# Patient Record
Sex: MALE | Race: WHITE | Employment: FULL TIME | URBAN - METROPOLITAN AREA
[De-identification: names, ages, dates, MRNs, and addresses within clinical notes are randomized per-mention and may not be internally consistent; named-entity substitution may affect disease eponyms.]

---

## 2018-10-23 ENCOUNTER — HOSPITAL ENCOUNTER (EMERGENCY)
Age: 43
Discharge: HOME | End: 2018-10-23
Attending: EMERGENCY MEDICINE
Payer: COMMERCIAL

## 2018-10-23 ENCOUNTER — APPOINTMENT (OUTPATIENT)
Dept: CT IMAGING | Age: 43
End: 2018-10-23
Payer: COMMERCIAL

## 2018-10-23 VITALS
TEMPERATURE: 98.9 F | RESPIRATION RATE: 16 BRPM | HEART RATE: 88 BPM | BODY MASS INDEX: 29.82 KG/M2 | SYSTOLIC BLOOD PRESSURE: 201 MMHG | HEIGHT: 67 IN | OXYGEN SATURATION: 94 % | WEIGHT: 190 LBS | DIASTOLIC BLOOD PRESSURE: 135 MMHG

## 2018-10-23 DIAGNOSIS — K85.20 ALCOHOL-INDUCED ACUTE PANCREATITIS, UNSPECIFIED COMPLICATION STATUS: Primary | ICD-10-CM

## 2018-10-23 LAB
ALBUMIN SERPL-MCNC: 4.3 G/DL (ref 3.5–5.2)
ALP BLD-CCNC: 114 U/L (ref 40–129)
ALT SERPL-CCNC: 45 U/L (ref 0–40)
ANION GAP SERPL CALCULATED.3IONS-SCNC: 15 MMOL/L (ref 7–16)
AST SERPL-CCNC: 57 U/L (ref 0–39)
BASOPHILS ABSOLUTE: 0.05 E9/L (ref 0–0.2)
BASOPHILS RELATIVE PERCENT: 0.4 % (ref 0–2)
BILIRUB SERPL-MCNC: 1.6 MG/DL (ref 0–1.2)
BUN BLDV-MCNC: 5 MG/DL (ref 6–20)
CALCIUM SERPL-MCNC: 9.4 MG/DL (ref 8.6–10.2)
CHLORIDE BLD-SCNC: 96 MMOL/L (ref 98–107)
CO2: 26 MMOL/L (ref 22–29)
CREAT SERPL-MCNC: 0.7 MG/DL (ref 0.7–1.2)
EOSINOPHILS ABSOLUTE: 0.09 E9/L (ref 0.05–0.5)
EOSINOPHILS RELATIVE PERCENT: 0.6 % (ref 0–6)
GFR AFRICAN AMERICAN: >60
GFR NON-AFRICAN AMERICAN: >60 ML/MIN/1.73
GLUCOSE BLD-MCNC: 108 MG/DL (ref 74–109)
HCT VFR BLD CALC: 52 % (ref 37–54)
HEMOGLOBIN: 18.7 G/DL (ref 12.5–16.5)
IMMATURE GRANULOCYTES #: 0.08 E9/L
IMMATURE GRANULOCYTES %: 0.6 % (ref 0–5)
LACTIC ACID: 1.2 MMOL/L (ref 0.5–2.2)
LIPASE: 1070 U/L (ref 13–60)
LYMPHOCYTES ABSOLUTE: 0.99 E9/L (ref 1.5–4)
LYMPHOCYTES RELATIVE PERCENT: 7.1 % (ref 20–42)
MCH RBC QN AUTO: 35.8 PG (ref 26–35)
MCHC RBC AUTO-ENTMCNC: 36 % (ref 32–34.5)
MCV RBC AUTO: 99.4 FL (ref 80–99.9)
MONOCYTES ABSOLUTE: 1.46 E9/L (ref 0.1–0.95)
MONOCYTES RELATIVE PERCENT: 10.5 % (ref 2–12)
NEUTROPHILS ABSOLUTE: 11.19 E9/L (ref 1.8–7.3)
NEUTROPHILS RELATIVE PERCENT: 80.8 % (ref 43–80)
PDW BLD-RTO: 12.3 FL (ref 11.5–15)
PLATELET # BLD: 229 E9/L (ref 130–450)
PMV BLD AUTO: 9.1 FL (ref 7–12)
POTASSIUM SERPL-SCNC: 4 MMOL/L (ref 3.5–5)
RBC # BLD: 5.23 E12/L (ref 3.8–5.8)
SODIUM BLD-SCNC: 137 MMOL/L (ref 132–146)
TOTAL PROTEIN: 8.2 G/DL (ref 6.4–8.3)
WBC # BLD: 13.9 E9/L (ref 4.5–11.5)

## 2018-10-23 PROCEDURE — 6360000002 HC RX W HCPCS: Performed by: EMERGENCY MEDICINE

## 2018-10-23 PROCEDURE — 36415 COLL VENOUS BLD VENIPUNCTURE: CPT

## 2018-10-23 PROCEDURE — 6360000004 HC RX CONTRAST MEDICATION: Performed by: RADIOLOGY

## 2018-10-23 PROCEDURE — 85025 COMPLETE CBC W/AUTO DIFF WBC: CPT

## 2018-10-23 PROCEDURE — 99284 EMERGENCY DEPT VISIT MOD MDM: CPT

## 2018-10-23 PROCEDURE — 80053 COMPREHEN METABOLIC PANEL: CPT

## 2018-10-23 PROCEDURE — 96376 TX/PRO/DX INJ SAME DRUG ADON: CPT

## 2018-10-23 PROCEDURE — 2580000003 HC RX 258: Performed by: EMERGENCY MEDICINE

## 2018-10-23 PROCEDURE — 83690 ASSAY OF LIPASE: CPT

## 2018-10-23 PROCEDURE — 74177 CT ABD & PELVIS W/CONTRAST: CPT

## 2018-10-23 PROCEDURE — 96374 THER/PROPH/DIAG INJ IV PUSH: CPT

## 2018-10-23 PROCEDURE — 96375 TX/PRO/DX INJ NEW DRUG ADDON: CPT

## 2018-10-23 PROCEDURE — 83605 ASSAY OF LACTIC ACID: CPT

## 2018-10-23 RX ORDER — MORPHINE SULFATE 4 MG/ML
4 INJECTION, SOLUTION INTRAMUSCULAR; INTRAVENOUS ONCE
Status: COMPLETED | OUTPATIENT
Start: 2018-10-23 | End: 2018-10-23

## 2018-10-23 RX ORDER — 0.9 % SODIUM CHLORIDE 0.9 %
1000 INTRAVENOUS SOLUTION INTRAVENOUS ONCE
Status: COMPLETED | OUTPATIENT
Start: 2018-10-23 | End: 2018-10-23

## 2018-10-23 RX ORDER — ONDANSETRON 2 MG/ML
4 INJECTION INTRAMUSCULAR; INTRAVENOUS ONCE
Status: COMPLETED | OUTPATIENT
Start: 2018-10-23 | End: 2018-10-23

## 2018-10-23 RX ORDER — HYDROCODONE BITARTRATE AND ACETAMINOPHEN 5; 325 MG/1; MG/1
1 TABLET ORAL EVERY 6 HOURS PRN
Qty: 8 TABLET | Refills: 0 | Status: SHIPPED | OUTPATIENT
Start: 2018-10-23 | End: 2018-10-25

## 2018-10-23 RX ADMIN — ONDANSETRON 4 MG: 2 INJECTION INTRAMUSCULAR; INTRAVENOUS at 10:05

## 2018-10-23 RX ADMIN — MORPHINE SULFATE 4 MG: 4 INJECTION INTRAVENOUS at 10:50

## 2018-10-23 RX ADMIN — MORPHINE SULFATE 4 MG: 4 INJECTION INTRAVENOUS at 10:05

## 2018-10-23 RX ADMIN — SODIUM CHLORIDE 1000 ML: 9 INJECTION, SOLUTION INTRAVENOUS at 10:05

## 2018-10-23 RX ADMIN — IOPAMIDOL 80 ML: 755 INJECTION, SOLUTION INTRAVENOUS at 11:21

## 2018-10-23 ASSESSMENT — PAIN DESCRIPTION - LOCATION
LOCATION: ABDOMEN
LOCATION: ABDOMEN;BACK
LOCATION: ABDOMEN
LOCATION: ABDOMEN

## 2018-10-23 ASSESSMENT — PAIN SCALES - GENERAL
PAINLEVEL_OUTOF10: 7
PAINLEVEL_OUTOF10: 7
PAINLEVEL_OUTOF10: 8
PAINLEVEL_OUTOF10: 9
PAINLEVEL_OUTOF10: 8

## 2018-10-23 ASSESSMENT — PAIN DESCRIPTION - DESCRIPTORS: DESCRIPTORS: THROBBING

## 2018-10-23 ASSESSMENT — ENCOUNTER SYMPTOMS
CONSTIPATION: 0
SINUS PAIN: 0
RHINORRHEA: 0
NAUSEA: 1
BACK PAIN: 0
SORE THROAT: 0
WHEEZING: 0
COUGH: 0
VOMITING: 1
SINUS PRESSURE: 0
SHORTNESS OF BREATH: 0
DIARRHEA: 0
PHOTOPHOBIA: 0
ABDOMINAL PAIN: 1

## 2018-10-23 ASSESSMENT — PAIN DESCRIPTION - ORIENTATION
ORIENTATION: RIGHT;LEFT;MID;UPPER
ORIENTATION: RIGHT;LEFT;MID;UPPER
ORIENTATION: RIGHT;LEFT;UPPER;MID
ORIENTATION: MID

## 2018-10-23 ASSESSMENT — PAIN DESCRIPTION - PAIN TYPE
TYPE: ACUTE PAIN

## 2018-10-23 ASSESSMENT — PAIN DESCRIPTION - FREQUENCY
FREQUENCY: CONTINUOUS

## 2018-10-23 NOTE — ED PROVIDER NOTES
(1.702 m)   Wt 190 lb (86.2 kg)   SpO2 94%   BMI 29.76 kg/m²   Oxygen Saturation Interpretation: Normal      ------------------------------------------ PROGRESS NOTES ------------------------------------------  Time: 11:41. Discussed results of the laboratory work and CT with the patient. I did advise the patient to be admitted for acute pancreatitis secondary to alcohol use. The patient states that he will think about it and will give me his decision and a little bit. Time: 12:19. The patient states that he is still unsure of his decision at this time. He will contact his boss. He is to see if he gets some time off. Time: 12:31. The patient states he does not want to be admitted this time. He does understand the risks associated with this. He wants to try doing treatment at home. Patient does understand that I strongly encouraged a clear liquid diet for the next 24 hours and then slowly advance his diet. Patient will be given Norco for his pain, only as needed. Patient will return if he has worsening symptoms. He has no further questions at this time. I have spoken with the patient and discussed todays results, in addition to providing specific details for the plan of care and counseling regarding the diagnosis and prognosis. Their questions are answered at this time and they are agreeable with the plan. I discussed at length with them reasons for immediate return here for re evaluation. They will followup with primary care by calling their office tomorrow. --------------------------------- ADDITIONAL PROVIDER NOTES ---------------------------------  At this time the patient is without objective evidence of an acute process requiring hospitalization or inpatient management. They have remained hemodynamically stable throughout their entire ED visit and are stable for discharge with outpatient follow-up.      The plan has been discussed in detail and they are aware of the specific conditions for

## 2020-06-08 ENCOUNTER — APPOINTMENT (OUTPATIENT)
Dept: GENERAL RADIOLOGY | Age: 45
End: 2020-06-08
Payer: COMMERCIAL

## 2020-06-08 ENCOUNTER — APPOINTMENT (OUTPATIENT)
Dept: CT IMAGING | Age: 45
End: 2020-06-08
Payer: COMMERCIAL

## 2020-06-08 ENCOUNTER — HOSPITAL ENCOUNTER (EMERGENCY)
Age: 45
Discharge: HOME | End: 2020-06-08
Attending: EMERGENCY MEDICINE
Payer: COMMERCIAL

## 2020-06-08 VITALS
SYSTOLIC BLOOD PRESSURE: 164 MMHG | BODY MASS INDEX: 32.18 KG/M2 | WEIGHT: 205 LBS | RESPIRATION RATE: 13 BRPM | OXYGEN SATURATION: 96 % | TEMPERATURE: 98.8 F | HEIGHT: 67 IN | DIASTOLIC BLOOD PRESSURE: 106 MMHG | HEART RATE: 87 BPM

## 2020-06-08 LAB
ALBUMIN SERPL-MCNC: 4.9 G/DL (ref 3.5–5.2)
ALP BLD-CCNC: 108 U/L (ref 40–129)
ALT SERPL-CCNC: 46 U/L (ref 0–40)
ANION GAP SERPL CALCULATED.3IONS-SCNC: 17 MMOL/L (ref 7–16)
ANISOCYTOSIS: ABNORMAL
AST SERPL-CCNC: 55 U/L (ref 0–39)
BASOPHILS ABSOLUTE: 0 E9/L (ref 0–0.2)
BASOPHILS RELATIVE PERCENT: 0.2 % (ref 0–2)
BILIRUB SERPL-MCNC: 1.6 MG/DL (ref 0–1.2)
BUN BLDV-MCNC: 12 MG/DL (ref 6–20)
CALCIUM SERPL-MCNC: 9.8 MG/DL (ref 8.6–10.2)
CHLORIDE BLD-SCNC: 94 MMOL/L (ref 98–107)
CO2: 24 MMOL/L (ref 22–29)
CREAT SERPL-MCNC: 0.8 MG/DL (ref 0.7–1.2)
EOSINOPHILS ABSOLUTE: 0 E9/L (ref 0.05–0.5)
EOSINOPHILS RELATIVE PERCENT: 0.7 % (ref 0–6)
GFR AFRICAN AMERICAN: >60
GFR NON-AFRICAN AMERICAN: >60 ML/MIN/1.73
GLUCOSE BLD-MCNC: 152 MG/DL (ref 74–99)
HCT VFR BLD CALC: 52.8 % (ref 37–54)
HEMOGLOBIN: 18.7 G/DL (ref 12.5–16.5)
LIPASE: 620 U/L (ref 13–60)
LYMPHOCYTES ABSOLUTE: 1.57 E9/L (ref 1.5–4)
LYMPHOCYTES RELATIVE PERCENT: 11.4 % (ref 20–42)
MCH RBC QN AUTO: 38.5 PG (ref 26–35)
MCHC RBC AUTO-ENTMCNC: 35.4 % (ref 32–34.5)
MCV RBC AUTO: 108.6 FL (ref 80–99.9)
MONOCYTES ABSOLUTE: 0.57 E9/L (ref 0.1–0.95)
MONOCYTES RELATIVE PERCENT: 4.4 % (ref 2–12)
NEUTROPHILS ABSOLUTE: 12.01 E9/L (ref 1.8–7.3)
NEUTROPHILS RELATIVE PERCENT: 84.2 % (ref 43–80)
PDW BLD-RTO: 14.3 FL (ref 11.5–15)
PLATELET # BLD: 236 E9/L (ref 130–450)
PMV BLD AUTO: 8.9 FL (ref 7–12)
POLYCHROMASIA: ABNORMAL
POTASSIUM REFLEX MAGNESIUM: 3.9 MMOL/L (ref 3.5–5)
RBC # BLD: 4.86 E12/L (ref 3.8–5.8)
SODIUM BLD-SCNC: 135 MMOL/L (ref 132–146)
TOTAL PROTEIN: 9.5 G/DL (ref 6.4–8.3)
TROPONIN: <0.01 NG/ML (ref 0–0.03)
WBC # BLD: 14.3 E9/L (ref 4.5–11.5)

## 2020-06-08 PROCEDURE — 85025 COMPLETE CBC W/AUTO DIFF WBC: CPT

## 2020-06-08 PROCEDURE — 96374 THER/PROPH/DIAG INJ IV PUSH: CPT

## 2020-06-08 PROCEDURE — 6360000002 HC RX W HCPCS: Performed by: EMERGENCY MEDICINE

## 2020-06-08 PROCEDURE — 80053 COMPREHEN METABOLIC PANEL: CPT

## 2020-06-08 PROCEDURE — 36415 COLL VENOUS BLD VENIPUNCTURE: CPT

## 2020-06-08 PROCEDURE — 96375 TX/PRO/DX INJ NEW DRUG ADDON: CPT

## 2020-06-08 PROCEDURE — 2580000003 HC RX 258: Performed by: EMERGENCY MEDICINE

## 2020-06-08 PROCEDURE — 74177 CT ABD & PELVIS W/CONTRAST: CPT

## 2020-06-08 PROCEDURE — 6360000004 HC RX CONTRAST MEDICATION: Performed by: RADIOLOGY

## 2020-06-08 PROCEDURE — 83690 ASSAY OF LIPASE: CPT

## 2020-06-08 PROCEDURE — 84484 ASSAY OF TROPONIN QUANT: CPT

## 2020-06-08 PROCEDURE — 99285 EMERGENCY DEPT VISIT HI MDM: CPT

## 2020-06-08 PROCEDURE — 71045 X-RAY EXAM CHEST 1 VIEW: CPT

## 2020-06-08 RX ORDER — KETOROLAC TROMETHAMINE 15 MG/ML
15 INJECTION, SOLUTION INTRAMUSCULAR; INTRAVENOUS ONCE
Status: COMPLETED | OUTPATIENT
Start: 2020-06-08 | End: 2020-06-08

## 2020-06-08 RX ORDER — FENTANYL CITRATE 50 UG/ML
50 INJECTION, SOLUTION INTRAMUSCULAR; INTRAVENOUS ONCE
Status: COMPLETED | OUTPATIENT
Start: 2020-06-08 | End: 2020-06-08

## 2020-06-08 RX ORDER — AMLODIPINE BESYLATE 5 MG/1
5 TABLET ORAL DAILY
COMMUNITY

## 2020-06-08 RX ORDER — ONDANSETRON 4 MG/1
4 TABLET, ORALLY DISINTEGRATING ORAL EVERY 6 HOURS PRN
Qty: 20 TABLET | Refills: 0 | Status: SHIPPED | OUTPATIENT
Start: 2020-06-08 | End: 2022-02-10 | Stop reason: ALTCHOICE

## 2020-06-08 RX ORDER — HYDROCODONE BITARTRATE AND ACETAMINOPHEN 5; 325 MG/1; MG/1
1 TABLET ORAL EVERY 6 HOURS PRN
Qty: 12 TABLET | Refills: 0 | Status: SHIPPED | OUTPATIENT
Start: 2020-06-08 | End: 2020-06-11

## 2020-06-08 RX ORDER — ONDANSETRON 2 MG/ML
4 INJECTION INTRAMUSCULAR; INTRAVENOUS ONCE
Status: COMPLETED | OUTPATIENT
Start: 2020-06-08 | End: 2020-06-08

## 2020-06-08 RX ORDER — LISINOPRIL 20 MG/1
20 TABLET ORAL DAILY
COMMUNITY
End: 2022-02-10 | Stop reason: ALTCHOICE

## 2020-06-08 RX ORDER — 0.9 % SODIUM CHLORIDE 0.9 %
1000 INTRAVENOUS SOLUTION INTRAVENOUS ONCE
Status: COMPLETED | OUTPATIENT
Start: 2020-06-08 | End: 2020-06-08

## 2020-06-08 RX ADMIN — FENTANYL CITRATE 50 MCG: 50 INJECTION, SOLUTION INTRAMUSCULAR; INTRAVENOUS at 06:17

## 2020-06-08 RX ADMIN — SODIUM CHLORIDE 1000 ML: 9 INJECTION, SOLUTION INTRAVENOUS at 04:59

## 2020-06-08 RX ADMIN — ONDANSETRON 4 MG: 2 INJECTION INTRAMUSCULAR; INTRAVENOUS at 05:00

## 2020-06-08 RX ADMIN — KETOROLAC TROMETHAMINE 15 MG: 15 INJECTION, SOLUTION INTRAMUSCULAR; INTRAVENOUS at 04:59

## 2020-06-08 RX ADMIN — IOPAMIDOL 80 ML: 755 INJECTION, SOLUTION INTRAVENOUS at 05:59

## 2020-06-08 ASSESSMENT — PAIN DESCRIPTION - FREQUENCY
FREQUENCY: CONTINUOUS
FREQUENCY: CONTINUOUS

## 2020-06-08 ASSESSMENT — PAIN SCALES - GENERAL
PAINLEVEL_OUTOF10: 9

## 2020-06-08 ASSESSMENT — PAIN DESCRIPTION - ONSET
ONSET: GRADUAL
ONSET: ON-GOING

## 2020-06-08 ASSESSMENT — PAIN DESCRIPTION - DESCRIPTORS
DESCRIPTORS: THROBBING
DESCRIPTORS: THROBBING

## 2020-06-08 ASSESSMENT — PAIN - FUNCTIONAL ASSESSMENT
PAIN_FUNCTIONAL_ASSESSMENT: PREVENTS OR INTERFERES WITH ALL ACTIVE AND SOME PASSIVE ACTIVITIES
PAIN_FUNCTIONAL_ASSESSMENT: PREVENTS OR INTERFERES WITH ALL ACTIVE AND SOME PASSIVE ACTIVITIES

## 2020-06-08 ASSESSMENT — PAIN DESCRIPTION - LOCATION
LOCATION: ABDOMEN;CHEST
LOCATION: ABDOMEN;CHEST

## 2020-06-08 ASSESSMENT — PAIN DESCRIPTION - PAIN TYPE
TYPE: ACUTE PAIN
TYPE: ACUTE PAIN

## 2020-06-08 ASSESSMENT — PAIN DESCRIPTION - PROGRESSION
CLINICAL_PROGRESSION: NOT CHANGED
CLINICAL_PROGRESSION: GRADUALLY WORSENING

## 2022-02-10 ENCOUNTER — HOSPITAL ENCOUNTER (INPATIENT)
Age: 47
LOS: 3 days | Discharge: HOME | DRG: 378 | End: 2022-02-13
Attending: STUDENT IN AN ORGANIZED HEALTH CARE EDUCATION/TRAINING PROGRAM | Admitting: INTERNAL MEDICINE
Payer: COMMERCIAL

## 2022-02-10 ENCOUNTER — APPOINTMENT (OUTPATIENT)
Dept: CT IMAGING | Age: 47
DRG: 378 | End: 2022-02-10
Payer: COMMERCIAL

## 2022-02-10 DIAGNOSIS — K92.2 GASTROINTESTINAL HEMORRHAGE, UNSPECIFIED GASTROINTESTINAL HEMORRHAGE TYPE: ICD-10-CM

## 2022-02-10 DIAGNOSIS — D64.9 ANEMIA, UNSPECIFIED TYPE: ICD-10-CM

## 2022-02-10 LAB
ABO/RH: NORMAL
ALBUMIN SERPL-MCNC: 4.1 G/DL (ref 3.5–5.2)
ALP BLD-CCNC: 90 U/L (ref 40–129)
ALT SERPL-CCNC: 15 U/L (ref 0–40)
ANION GAP SERPL CALCULATED.3IONS-SCNC: 16 MMOL/L (ref 7–16)
ANISOCYTOSIS: ABNORMAL
ANTIBODY SCREEN: NORMAL
AST SERPL-CCNC: 39 U/L (ref 0–39)
BACTERIA: ABNORMAL /HPF
BASOPHILS ABSOLUTE: 0.06 E9/L (ref 0–0.2)
BASOPHILS RELATIVE PERCENT: 0.9 % (ref 0–2)
BILIRUB SERPL-MCNC: 0.5 MG/DL (ref 0–1.2)
BILIRUBIN URINE: NEGATIVE
BLOOD, URINE: ABNORMAL
BUN BLDV-MCNC: 8 MG/DL (ref 6–20)
CALCIUM SERPL-MCNC: 9.4 MG/DL (ref 8.6–10.2)
CHLORIDE BLD-SCNC: 101 MMOL/L (ref 98–107)
CHP ED QC CHECK: NORMAL
CLARITY: CLEAR
CO2: 20 MMOL/L (ref 22–29)
COLOR: YELLOW
CREAT SERPL-MCNC: 0.8 MG/DL (ref 0.7–1.2)
EOSINOPHILS ABSOLUTE: 0.25 E9/L (ref 0.05–0.5)
EOSINOPHILS RELATIVE PERCENT: 3.6 % (ref 0–6)
EPITHELIAL CELLS, UA: ABNORMAL /HPF
FERRITIN: 7 NG/ML
FOLATE: 4.5 NG/ML (ref 4.8–24.2)
GFR AFRICAN AMERICAN: >60
GFR NON-AFRICAN AMERICAN: >60 ML/MIN/1.73
GLUCOSE BLD-MCNC: 163 MG/DL (ref 74–99)
GLUCOSE URINE: NEGATIVE MG/DL
HCT VFR BLD CALC: 23.7 % (ref 37–54)
HCT VFR BLD CALC: 24.3 % (ref 37–54)
HEMOCCULT STL QL: POSITIVE
HEMOGLOBIN: 6.1 G/DL (ref 12.5–16.5)
HEMOGLOBIN: 6.2 G/DL (ref 12.5–16.5)
HYPOCHROMIA: ABNORMAL
INR BLD: 1.2
IRON SATURATION: 4 % (ref 20–55)
IRON: 20 MCG/DL (ref 59–158)
KETONES, URINE: NEGATIVE MG/DL
LACTATE DEHYDROGENASE: 200 U/L (ref 135–225)
LACTIC ACID: 2.3 MMOL/L (ref 0.5–2.2)
LEUKOCYTE ESTERASE, URINE: ABNORMAL
LIPASE: 20 U/L (ref 13–60)
LYMPHOCYTES ABSOLUTE: 1.4 E9/L (ref 1.5–4)
LYMPHOCYTES RELATIVE PERCENT: 19.8 % (ref 20–42)
MCH RBC QN AUTO: 18 PG (ref 26–35)
MCHC RBC AUTO-ENTMCNC: 25.5 % (ref 32–34.5)
MCV RBC AUTO: 70.4 FL (ref 80–99.9)
MONOCYTES ABSOLUTE: 0.42 E9/L (ref 0.1–0.95)
MONOCYTES RELATIVE PERCENT: 6.3 % (ref 2–12)
NEUTROPHILS ABSOLUTE: 4.83 E9/L (ref 1.8–7.3)
NEUTROPHILS RELATIVE PERCENT: 69.4 % (ref 43–80)
NITRITE, URINE: NEGATIVE
OVALOCYTES: ABNORMAL
PDW BLD-RTO: 20.7 FL (ref 11.5–15)
PH UA: 7.5 (ref 5–9)
PLATELET # BLD: 169 E9/L (ref 130–450)
PMV BLD AUTO: 10.1 FL (ref 7–12)
POIKILOCYTES: ABNORMAL
POTASSIUM REFLEX MAGNESIUM: 4 MMOL/L (ref 3.5–5)
PROTEIN UA: NEGATIVE MG/DL
PROTHROMBIN TIME: 13.9 SEC (ref 9.3–12.4)
RBC # BLD: 3.45 E12/L (ref 3.8–5.8)
RBC UA: ABNORMAL /HPF (ref 0–2)
SODIUM BLD-SCNC: 137 MMOL/L (ref 132–146)
SPECIFIC GRAVITY UA: 1.01 (ref 1–1.03)
STREP GRP A PCR: NEGATIVE
TARGET CELLS: ABNORMAL
TOTAL IRON BINDING CAPACITY: 544 MCG/DL (ref 250–450)
TOTAL PROTEIN: 8.2 G/DL (ref 6.4–8.3)
TROPONIN, HIGH SENSITIVITY: 7 NG/L (ref 0–11)
UROBILINOGEN, URINE: 0.2 E.U./DL
VITAMIN B-12: 195 PG/ML (ref 211–946)
WBC # BLD: 7 E9/L (ref 4.5–11.5)
WBC UA: ABNORMAL /HPF (ref 0–5)

## 2022-02-10 PROCEDURE — 2580000003 HC RX 258: Performed by: INTERNAL MEDICINE

## 2022-02-10 PROCEDURE — 80179 DRUG ASSAY SALICYLATE: CPT

## 2022-02-10 PROCEDURE — 6360000002 HC RX W HCPCS: Performed by: STUDENT IN AN ORGANIZED HEALTH CARE EDUCATION/TRAINING PROGRAM

## 2022-02-10 PROCEDURE — P9016 RBC LEUKOCYTES REDUCED: HCPCS

## 2022-02-10 PROCEDURE — 84484 ASSAY OF TROPONIN QUANT: CPT

## 2022-02-10 PROCEDURE — 85610 PROTHROMBIN TIME: CPT

## 2022-02-10 PROCEDURE — 80053 COMPREHEN METABOLIC PANEL: CPT

## 2022-02-10 PROCEDURE — 82607 VITAMIN B-12: CPT

## 2022-02-10 PROCEDURE — 86900 BLOOD TYPING SEROLOGIC ABO: CPT

## 2022-02-10 PROCEDURE — 1200000000 HC SEMI PRIVATE

## 2022-02-10 PROCEDURE — 2580000003 HC RX 258: Performed by: STUDENT IN AN ORGANIZED HEALTH CARE EDUCATION/TRAINING PROGRAM

## 2022-02-10 PROCEDURE — 83540 ASSAY OF IRON: CPT

## 2022-02-10 PROCEDURE — 81001 URINALYSIS AUTO W/SCOPE: CPT

## 2022-02-10 PROCEDURE — 85018 HEMOGLOBIN: CPT

## 2022-02-10 PROCEDURE — 83615 LACTATE (LD) (LDH) ENZYME: CPT

## 2022-02-10 PROCEDURE — 6370000000 HC RX 637 (ALT 250 FOR IP): Performed by: INTERNAL MEDICINE

## 2022-02-10 PROCEDURE — 85025 COMPLETE CBC W/AUTO DIFF WBC: CPT

## 2022-02-10 PROCEDURE — 80143 DRUG ASSAY ACETAMINOPHEN: CPT

## 2022-02-10 PROCEDURE — 86850 RBC ANTIBODY SCREEN: CPT

## 2022-02-10 PROCEDURE — 82077 ASSAY SPEC XCP UR&BREATH IA: CPT

## 2022-02-10 PROCEDURE — 83605 ASSAY OF LACTIC ACID: CPT

## 2022-02-10 PROCEDURE — 83690 ASSAY OF LIPASE: CPT

## 2022-02-10 PROCEDURE — 87880 STREP A ASSAY W/OPTIC: CPT

## 2022-02-10 PROCEDURE — 86923 COMPATIBILITY TEST ELECTRIC: CPT

## 2022-02-10 PROCEDURE — 85014 HEMATOCRIT: CPT

## 2022-02-10 PROCEDURE — C9113 INJ PANTOPRAZOLE SODIUM, VIA: HCPCS | Performed by: INTERNAL MEDICINE

## 2022-02-10 PROCEDURE — 36415 COLL VENOUS BLD VENIPUNCTURE: CPT

## 2022-02-10 PROCEDURE — 86901 BLOOD TYPING SEROLOGIC RH(D): CPT

## 2022-02-10 PROCEDURE — 80307 DRUG TEST PRSMV CHEM ANLYZR: CPT

## 2022-02-10 PROCEDURE — 36430 TRANSFUSION BLD/BLD COMPNT: CPT

## 2022-02-10 PROCEDURE — 82728 ASSAY OF FERRITIN: CPT

## 2022-02-10 PROCEDURE — 83550 IRON BINDING TEST: CPT

## 2022-02-10 PROCEDURE — 82746 ASSAY OF FOLIC ACID SERUM: CPT

## 2022-02-10 PROCEDURE — C9113 INJ PANTOPRAZOLE SODIUM, VIA: HCPCS | Performed by: STUDENT IN AN ORGANIZED HEALTH CARE EDUCATION/TRAINING PROGRAM

## 2022-02-10 PROCEDURE — 74177 CT ABD & PELVIS W/CONTRAST: CPT

## 2022-02-10 PROCEDURE — 93005 ELECTROCARDIOGRAM TRACING: CPT | Performed by: STUDENT IN AN ORGANIZED HEALTH CARE EDUCATION/TRAINING PROGRAM

## 2022-02-10 PROCEDURE — 6360000002 HC RX W HCPCS: Performed by: INTERNAL MEDICINE

## 2022-02-10 PROCEDURE — 99284 EMERGENCY DEPT VISIT MOD MDM: CPT

## 2022-02-10 RX ORDER — DEXTROSE MONOHYDRATE 50 MG/ML
100 INJECTION, SOLUTION INTRAVENOUS PRN
Status: DISCONTINUED | OUTPATIENT
Start: 2022-02-10 | End: 2022-02-13 | Stop reason: HOSPADM

## 2022-02-10 RX ORDER — SODIUM CHLORIDE 0.9 % (FLUSH) 0.9 %
5-40 SYRINGE (ML) INJECTION EVERY 12 HOURS SCHEDULED
Status: DISCONTINUED | OUTPATIENT
Start: 2022-02-10 | End: 2022-02-10 | Stop reason: SDUPTHER

## 2022-02-10 RX ORDER — LORAZEPAM 1 MG/1
2 TABLET ORAL
Status: DISCONTINUED | OUTPATIENT
Start: 2022-02-10 | End: 2022-02-10

## 2022-02-10 RX ORDER — CLOBETASOL PROPIONATE 0.5 MG/G
1 CREAM TOPICAL 2 TIMES DAILY
COMMUNITY

## 2022-02-10 RX ORDER — ONDANSETRON 4 MG/1
4 TABLET, ORALLY DISINTEGRATING ORAL EVERY 8 HOURS PRN
Status: DISCONTINUED | OUTPATIENT
Start: 2022-02-10 | End: 2022-02-12

## 2022-02-10 RX ORDER — SODIUM CHLORIDE 9 MG/ML
INJECTION, SOLUTION INTRAVENOUS CONTINUOUS
Status: DISCONTINUED | OUTPATIENT
Start: 2022-02-10 | End: 2022-02-12

## 2022-02-10 RX ORDER — SODIUM CHLORIDE 9 MG/ML
INJECTION, SOLUTION INTRAVENOUS PRN
Status: DISCONTINUED | OUTPATIENT
Start: 2022-02-10 | End: 2022-02-13 | Stop reason: HOSPADM

## 2022-02-10 RX ORDER — LORAZEPAM 2 MG/ML
1 INJECTION INTRAMUSCULAR
Status: DISCONTINUED | OUTPATIENT
Start: 2022-02-10 | End: 2022-02-11

## 2022-02-10 RX ORDER — LORAZEPAM 2 MG/ML
3 INJECTION INTRAMUSCULAR
Status: DISCONTINUED | OUTPATIENT
Start: 2022-02-10 | End: 2022-02-10

## 2022-02-10 RX ORDER — LORAZEPAM 2 MG/ML
4 INJECTION INTRAMUSCULAR
Status: DISCONTINUED | OUTPATIENT
Start: 2022-02-10 | End: 2022-02-10

## 2022-02-10 RX ORDER — LORAZEPAM 2 MG/ML
2 INJECTION INTRAMUSCULAR
Status: DISCONTINUED | OUTPATIENT
Start: 2022-02-10 | End: 2022-02-10

## 2022-02-10 RX ORDER — LORAZEPAM 1 MG/1
3 TABLET ORAL
Status: DISCONTINUED | OUTPATIENT
Start: 2022-02-10 | End: 2022-02-10

## 2022-02-10 RX ORDER — LORAZEPAM 1 MG/1
4 TABLET ORAL
Status: DISCONTINUED | OUTPATIENT
Start: 2022-02-10 | End: 2022-02-10

## 2022-02-10 RX ORDER — LORAZEPAM 1 MG/1
1 TABLET ORAL
Status: DISCONTINUED | OUTPATIENT
Start: 2022-02-10 | End: 2022-02-11

## 2022-02-10 RX ORDER — 0.9 % SODIUM CHLORIDE 0.9 %
1000 INTRAVENOUS SOLUTION INTRAVENOUS ONCE
Status: COMPLETED | OUTPATIENT
Start: 2022-02-10 | End: 2022-02-10

## 2022-02-10 RX ORDER — SODIUM CHLORIDE 9 MG/ML
25 INJECTION, SOLUTION INTRAVENOUS PRN
Status: DISCONTINUED | OUTPATIENT
Start: 2022-02-10 | End: 2022-02-13 | Stop reason: HOSPADM

## 2022-02-10 RX ORDER — SODIUM CHLORIDE 9 MG/ML
25 INJECTION, SOLUTION INTRAVENOUS PRN
Status: DISCONTINUED | OUTPATIENT
Start: 2022-02-10 | End: 2022-02-10 | Stop reason: SDUPTHER

## 2022-02-10 RX ORDER — NICOTINE POLACRILEX 4 MG
15 LOZENGE BUCCAL PRN
Status: DISCONTINUED | OUTPATIENT
Start: 2022-02-10 | End: 2022-02-13 | Stop reason: HOSPADM

## 2022-02-10 RX ORDER — OMEPRAZOLE 20 MG/1
20 CAPSULE, DELAYED RELEASE ORAL DAILY
Status: ON HOLD | COMMUNITY
End: 2022-02-12 | Stop reason: HOSPADM

## 2022-02-10 RX ORDER — SODIUM CHLORIDE 0.9 % (FLUSH) 0.9 %
5-40 SYRINGE (ML) INJECTION EVERY 12 HOURS SCHEDULED
Status: DISCONTINUED | OUTPATIENT
Start: 2022-02-10 | End: 2022-02-13 | Stop reason: HOSPADM

## 2022-02-10 RX ORDER — ONDANSETRON 2 MG/ML
4 INJECTION INTRAMUSCULAR; INTRAVENOUS EVERY 6 HOURS PRN
Status: DISCONTINUED | OUTPATIENT
Start: 2022-02-10 | End: 2022-02-12

## 2022-02-10 RX ORDER — ACETAMINOPHEN 650 MG/1
650 SUPPOSITORY RECTAL EVERY 6 HOURS PRN
Status: DISCONTINUED | OUTPATIENT
Start: 2022-02-10 | End: 2022-02-13 | Stop reason: HOSPADM

## 2022-02-10 RX ORDER — 0.9 % SODIUM CHLORIDE 0.9 %
500 INTRAVENOUS SOLUTION INTRAVENOUS ONCE
Status: DISCONTINUED | OUTPATIENT
Start: 2022-02-10 | End: 2022-02-10

## 2022-02-10 RX ORDER — SODIUM CHLORIDE 0.9 % (FLUSH) 0.9 %
5-40 SYRINGE (ML) INJECTION PRN
Status: DISCONTINUED | OUTPATIENT
Start: 2022-02-10 | End: 2022-02-10 | Stop reason: SDUPTHER

## 2022-02-10 RX ORDER — ACETAMINOPHEN 325 MG/1
650 TABLET ORAL EVERY 6 HOURS PRN
Status: DISCONTINUED | OUTPATIENT
Start: 2022-02-10 | End: 2022-02-13 | Stop reason: HOSPADM

## 2022-02-10 RX ORDER — SODIUM CHLORIDE 0.9 % (FLUSH) 0.9 %
5-40 SYRINGE (ML) INJECTION PRN
Status: DISCONTINUED | OUTPATIENT
Start: 2022-02-10 | End: 2022-02-13 | Stop reason: HOSPADM

## 2022-02-10 RX ORDER — POLYETHYLENE GLYCOL 3350 17 G/17G
17 POWDER, FOR SOLUTION ORAL DAILY PRN
Status: DISCONTINUED | OUTPATIENT
Start: 2022-02-10 | End: 2022-02-13 | Stop reason: HOSPADM

## 2022-02-10 RX ORDER — DEXTROSE MONOHYDRATE 25 G/50ML
12.5 INJECTION, SOLUTION INTRAVENOUS PRN
Status: DISCONTINUED | OUTPATIENT
Start: 2022-02-10 | End: 2022-02-10 | Stop reason: CLARIF

## 2022-02-10 RX ORDER — LOSARTAN POTASSIUM 25 MG/1
25 TABLET ORAL DAILY
COMMUNITY

## 2022-02-10 RX ORDER — OXYMETAZOLINE HYDROCHLORIDE 0.05 G/100ML
2 SPRAY NASAL 2 TIMES DAILY
COMMUNITY

## 2022-02-10 RX ADMIN — NYSTATIN 500000 UNITS: 100000 SUSPENSION ORAL at 22:14

## 2022-02-10 RX ADMIN — Medication 10 ML: at 22:14

## 2022-02-10 RX ADMIN — LORAZEPAM 1 MG: 2 INJECTION INTRAMUSCULAR; INTRAVENOUS at 23:21

## 2022-02-10 RX ADMIN — SODIUM CHLORIDE 1000 ML: 9 INJECTION, SOLUTION INTRAVENOUS at 16:48

## 2022-02-10 RX ADMIN — SODIUM CHLORIDE 8 MG/HR: 9 INJECTION, SOLUTION INTRAVENOUS at 18:00

## 2022-02-10 RX ADMIN — SODIUM CHLORIDE 80 MG: 9 INJECTION, SOLUTION INTRAVENOUS at 16:48

## 2022-02-10 ASSESSMENT — ENCOUNTER SYMPTOMS
CHEST TIGHTNESS: 0
PHOTOPHOBIA: 0
SHORTNESS OF BREATH: 0
ABDOMINAL PAIN: 0
BLOOD IN STOOL: 1
DIARRHEA: 0
NAUSEA: 0
VOMITING: 0
ABDOMINAL DISTENTION: 0
COUGH: 0
BACK PAIN: 0

## 2022-02-10 ASSESSMENT — PAIN DESCRIPTION - LOCATION: LOCATION: GENERALIZED

## 2022-02-10 ASSESSMENT — PAIN SCALES - GENERAL
PAINLEVEL_OUTOF10: 3
PAINLEVEL_OUTOF10: 0
PAINLEVEL_OUTOF10: 0

## 2022-02-10 NOTE — ED PROVIDER NOTES
Jocelynn Samuels is a 51year old male with PMH of alcohol abuse who presented to emergency department with concern for fatigue and anemia. Patient stated that his symptoms of fatigue started around November. Patient suspected he had Covid and had a fever and fatigue. Patient then improved but has slowly worsening fatigue since that time. Patient does use tobacco and does drink alcohol every day at least 3 mixed drinks per day. Patient denies any history of liver failure patient has had bright red blood in the toilet after bowel movements but has not had any at rest.  Patient also with history of hemorrhoids. Patient denies any black tarry stool or hematemesis. Patient does not have any known history of varices or gastric ulcers. Patient stated that over the past several weeks his fatigue has worsened. Patient went to his PCP on Monday and had a Covid test and blood work done patient was found to be anemic and told to present to emergency department for evaluation. Patient denies a history of alcohol withdrawal.  Patient denies any cancer history. Patient has not tried thing for symptoms other make symptoms better or worse symptoms moderate in severity and constant and worsening symptoms have been worse over the last 1 to 2 weeks but have been present for several months, patient began to have exertional shortness of breath that began around 1-2 weeks ago. Patient has never had a blood tranfusion. Patient denies trauma. The history is provided by the patient and medical records. Review of Systems   Constitutional: Positive for fatigue. Negative for chills, diaphoresis and fever. Eyes: Negative for photophobia and visual disturbance. Respiratory: Negative for cough, chest tightness and shortness of breath. Cardiovascular: Negative for chest pain, palpitations and leg swelling. Gastrointestinal: Positive for blood in stool.  Negative for abdominal distention, abdominal pain, diarrhea, nausea and vomiting. Genitourinary: Negative for dysuria. Musculoskeletal: Negative for back pain, neck pain and neck stiffness. Skin: Negative for pallor and rash. Neurological: Negative for headaches. Psychiatric/Behavioral: Negative for confusion. Physical Exam  Vitals and nursing note reviewed. Constitutional:       General: He is not in acute distress. Appearance: Normal appearance. He is not ill-appearing. HENT:      Head: Normocephalic and atraumatic. Eyes:      Pupils: Pupils are equal, round, and reactive to light. Cardiovascular:      Rate and Rhythm: Regular rhythm. Tachycardia present. Pulmonary:      Effort: Pulmonary effort is normal.      Breath sounds: Normal breath sounds. Abdominal:      General: Bowel sounds are normal. There is no distension. Palpations: Abdomen is soft. Tenderness: There is no abdominal tenderness. There is no guarding or rebound. Genitourinary:     Rectum: Guaiac result positive. Comments: Bright red and dark red blood mild amount on exam, internal hemorrhoid present  Hemoccult positive  Musculoskeletal:      Cervical back: Normal range of motion and neck supple. No rigidity or tenderness. No muscular tenderness. Right lower leg: No edema. Left lower leg: No edema. Skin:     General: Skin is warm and dry. Capillary Refill: Capillary refill takes less than 2 seconds. Coloration: Skin is pale. Findings: No erythema or rash. Neurological:      Mental Status: He is alert and oriented to person, place, and time. Psychiatric:         Mood and Affect: Mood normal.          Procedures     MDM  Number of Diagnoses or Management Options  Anemia, unspecified type  Gastrointestinal hemorrhage, unspecified gastrointestinal hemorrhage type  Diagnosis management comments: Jesica Colón is a 51year old male who presented to ED with concern for fatigue and anemia.   Patient was found to be Hemoccult positive and did have a very small amount of blood on rectal exam.  Patient was given Protonix with history of alcohol abuse patient does not have findings concerning for likely upper GI bleed likely patient's anemia has been present over a long period of time but has recently worsened. Anemia labs are pending patient was hemodynamically stable on emergency department admitting physician would like patient started on octreotide drip    patient does have a history of alcohol abuse and serum drug screen is pending patient was placed on CIWA patient does not have findings currently to suggest alcohol withdrawal.  Patient stable while in ED and will be admitted for GI bleed. CT scan was significant for possible pancreatitis, patient is not having abdominal pain, lipase pending  Patient will be admitted for further evaluation       ED Course as of 02/12/22 0308   Thu Feb 10, 2022   1622 Patient accepted for admission to dr. Ernesto Miller by Santhosh Solorzano []      ED Course User Index  [] Kristian Botello MD        ED Course as of 02/12/22 0308 Th Feb 10, 2022   1622 Patient accepted for admission to dr. Ernesto Miller by Santhosh Solorzano []      ED Course User Index  [] Kristian Botello MD       --------------------------------------------- PAST HISTORY ---------------------------------------------  Past Medical History:  has a past medical history of Cancer [facility]), Class 2 severe obesity due to excess calories with serious comorbidity and body mass index (BMI) of 36.0 to 36.9 in adult [facility]), Hypertension, Kidney stone, Pancreatitis, Psoriasis, and Renal calculi. Past Surgical History:  has a past surgical history that includes Kidney stone surgery and lymphadenectomy. Social History:  reports that he has been smoking cigarettes. He has been smoking about 1.00 pack per day. He has never used smokeless tobacco. He reports current alcohol use of about 35.0 standard drinks of alcohol per week. He reports that he does not use drugs.     Family History: family history includes Coronary Art Dis in his father; Other in his mother. The patients home medications have been reviewed. Allergies: Patient has no known allergies.     -------------------------------------------------- RESULTS -------------------------------------------------    LABS:  Results for orders placed or performed during the hospital encounter of 02/10/22   Strep screen group a throat    Specimen: Throat swab   Result Value Ref Range    Strep Grp A PCR Negative Negative   CBC auto differential   Result Value Ref Range    WBC 7.0 4.5 - 11.5 E9/L    RBC 3.45 (L) 3.80 - 5.80 E12/L    Hemoglobin 6.2 (LL) 12.5 - 16.5 g/dL    Hematocrit 24.3 (L) 37.0 - 54.0 %    MCV 70.4 (L) 80.0 - 99.9 fL    MCH 18.0 (L) 26.0 - 35.0 pg    MCHC 25.5 (L) 32.0 - 34.5 %    RDW 20.7 (H) 11.5 - 15.0 fL    Platelets 906 270 - 951 E9/L    MPV 10.1 7.0 - 12.0 fL    Neutrophils % 69.4 43.0 - 80.0 %    Lymphocytes % 19.8 (L) 20.0 - 42.0 %    Monocytes % 6.3 2.0 - 12.0 %    Eosinophils % 3.6 0.0 - 6.0 %    Basophils % 0.9 0.0 - 2.0 %    Neutrophils Absolute 4.83 1.80 - 7.30 E9/L    Lymphocytes Absolute 1.40 (L) 1.50 - 4.00 E9/L    Monocytes Absolute 0.42 0.10 - 0.95 E9/L    Eosinophils Absolute 0.25 0.05 - 0.50 E9/L    Basophils Absolute 0.06 0.00 - 0.20 E9/L    Anisocytosis 2+     Hypochromia 3+     Poikilocytes 1+     Ovalocytes 1+     Target Cells 1+    Comprehensive Metabolic Panel w/ Reflex to MG   Result Value Ref Range    Sodium 137 132 - 146 mmol/L    Potassium reflex Magnesium 4.0 3.5 - 5.0 mmol/L    Chloride 101 98 - 107 mmol/L    CO2 20 (L) 22 - 29 mmol/L    Anion Gap 16 7 - 16 mmol/L    Glucose 163 (H) 74 - 99 mg/dL    BUN 8 6 - 20 mg/dL    CREATININE 0.8 0.7 - 1.2 mg/dL    GFR Non-African American >60 >=60 mL/min/1.73    GFR African American >60     Calcium 9.4 8.6 - 10.2 mg/dL    Total Protein 8.2 6.4 - 8.3 g/dL    Albumin 4.1 3.5 - 5.2 g/dL    Total Bilirubin 0.5 0.0 - 1.2 mg/dL    Alkaline Phosphatase 90 40 - 129 U/L    ALT 15 0 - 40 U/L    AST 39 0 - 39 U/L   Troponin   Result Value Ref Range    Troponin, High Sensitivity 7 0 - 11 ng/L   Ferritin   Result Value Ref Range    Ferritin 7 ng/mL   Iron and TIBC   Result Value Ref Range    Iron 20 (L) 59 - 158 mcg/dL    TIBC 544 (H) 250 - 450 mcg/dL    Iron Saturation 4 (L) 20 - 55 %   Vitamin B12 & Folate   Result Value Ref Range    Vitamin B-12 195 (L) 211 - 946 pg/mL    Folate 4.5 (L) 4.8 - 24.2 ng/mL   Lactate dehydrogenase   Result Value Ref Range     135 - 225 U/L   Urinalysis with Microscopic   Result Value Ref Range    Color, UA Yellow Straw/Yellow    Clarity, UA Clear Clear    Glucose, Ur Negative Negative mg/dL    Bilirubin Urine Negative Negative    Ketones, Urine Negative Negative mg/dL    Specific Gravity, UA 1.015 1.005 - 1.030    Blood, Urine TRACE (A) Negative    pH, UA 7.5 5.0 - 9.0    Protein, UA Negative Negative mg/dL    Urobilinogen, Urine 0.2 <2.0 E.U./dL    Nitrite, Urine Negative Negative    Leukocyte Esterase, Urine TRACE (A) Negative    WBC, UA 1-3 0 - 5 /HPF    RBC, UA NONE 0 - 2 /HPF    Epithelial Cells, UA NONE SEEN /HPF    Bacteria, UA RARE (A) None Seen /HPF   Lactic Acid, Plasma   Result Value Ref Range    Lactic Acid 2.3 (H) 0.5 - 2.2 mmol/L   Serum Drug Screen   Result Value Ref Range    Ethanol Lvl <10 mg/dL    Acetaminophen Level <5.0 (L) 10.0 - 03.9 mcg/mL    Salicylate, Serum <1.1 0.0 - 30.0 mg/dL    TCA Scrn NEGATIVE Cutoff:300 ng/mL   Protime-INR   Result Value Ref Range    Protime 13.9 (H) 9.3 - 12.4 sec    INR 1.2    Lipase   Result Value Ref Range    Lipase 20 13 - 60 U/L   Hemoglobin and Hematocrit, Blood   Result Value Ref Range    Hemoglobin 6.1 (LL) 12.5 - 16.5 g/dL    Hematocrit 23.7 (L) 37.0 - 54.0 %   Comprehensive Metabolic Panel   Result Value Ref Range    Sodium 137 132 - 146 mmol/L    Potassium 4.3 3.5 - 5.0 mmol/L    Chloride 102 98 - 107 mmol/L    CO2 24 22 - 29 mmol/L    Anion Gap 11 7 - 16 mmol/L Glucose 158 (H) 74 - 99 mg/dL    BUN 7 6 - 20 mg/dL    CREATININE 0.8 0.7 - 1.2 mg/dL    GFR Non-African American >60 >=60 mL/min/1.73    GFR African American >60     Calcium 8.8 8.6 - 10.2 mg/dL    Total Protein 7.7 6.4 - 8.3 g/dL    Albumin 3.8 3.5 - 5.2 g/dL    Total Bilirubin 1.0 0.0 - 1.2 mg/dL    Alkaline Phosphatase 81 40 - 129 U/L    ALT 13 0 - 40 U/L    AST 32 0 - 39 U/L   CBC Auto Differential   Result Value Ref Range    WBC 6.5 4.5 - 11.5 E9/L    RBC 3.68 (L) 3.80 - 5.80 E12/L    Hemoglobin 7.1 (L) 12.5 - 16.5 g/dL    Hematocrit 26.6 (L) 37.0 - 54.0 %    MCV 72.3 (L) 80.0 - 99.9 fL    MCH 19.3 (L) 26.0 - 35.0 pg    MCHC 26.7 (L) 32.0 - 34.5 %    RDW 21.5 (H) 11.5 - 15.0 fL    Platelets 569 196 - 979 E9/L    MPV 9.8 7.0 - 12.0 fL    Neutrophils % 73.5 43.0 - 80.0 %    Lymphocytes % 16.8 (L) 20.0 - 42.0 %    Monocytes % 4.4 2.0 - 12.0 %    Eosinophils % 4.4 0.0 - 6.0 %    Basophils % 0.6 0.0 - 2.0 %    Neutrophils Absolute 4.81 1.80 - 7.30 E9/L    Lymphocytes Absolute 1.11 (L) 1.50 - 4.00 E9/L    Monocytes Absolute 0.26 0.10 - 0.95 E9/L    Eosinophils Absolute 0.29 0.05 - 0.50 E9/L    Basophils Absolute 0.00 0.00 - 0.20 E9/L    Myelocyte Percent 0.9 0 - 0 %    nRBC 0.9 /100 WBC    Anisocytosis 1+     Polychromasia 2+     Hypochromia 2+     Poikilocytes 1+     Ovalocytes 1+     Target Cells 1+    Magnesium   Result Value Ref Range    Magnesium 1.6 1.6 - 2.6 mg/dL   Phosphorus   Result Value Ref Range    Phosphorus 3.5 2.5 - 4.5 mg/dL   TSH without Reflex   Result Value Ref Range    TSH 1.210 0.270 - 4.200 uIU/mL   Hemoglobin and Hematocrit, Blood   Result Value Ref Range    Hemoglobin 7.2 (L) 12.5 - 16.5 g/dL    Hematocrit 26.8 (L) 37.0 - 54.0 %   Hemoglobin and hematocrit, blood   Result Value Ref Range    Hemoglobin 7.2 (L) 12.5 - 16.5 g/dL    Hematocrit 26.8 (L) 37.0 - 54.0 %   Hemoglobin and Hematocrit, Blood   Result Value Ref Range    Hemoglobin 7.6 (L) 12.5 - 16.5 g/dL    Hematocrit 28.1 (L) 37.0 - 54.0 %   LACTIC ACID, PLASMA   Result Value Ref Range    Lactic Acid 0.9 0.5 - 2.2 mmol/L   Reticulocytes   Result Value Ref Range    Retic Ct Pct 1.4 0.4 - 1.9 %    Retic Ct Abs 0.051 E12/L    Immature Retic Fract 33.7 (H) 2.3 - 13.4 %    Hematocrit 26.1 (L) 37.0 - 54.0 %    Retic HGB Equivalent 14.8 (L) 28.2 - 36.6 pg   Hemoglobin and Hematocrit, Blood   Result Value Ref Range    Hemoglobin 7.3 (L) 12.5 - 16.5 g/dL    Hematocrit 26.8 (L) 37.0 - 54.0 %   CEA   Result Value Ref Range    CEA 3.3 0.0 - 5.2 ng/mL   POCT occult blood stool   Result Value Ref Range    OCCULT BLOOD FECAL positive     QC OK? ok    EKG 12 Lead   Result Value Ref Range    Ventricular Rate 89 BPM    Atrial Rate 89 BPM    P-R Interval 150 ms    QRS Duration 100 ms    Q-T Interval 390 ms    QTc Calculation (Bazett) 474 ms    P Axis 36 degrees    R Axis 31 degrees    T Axis 33 degrees   TYPE AND SCREEN   Result Value Ref Range    ABO/Rh O POS     Antibody Screen NEG    PREPARE RBC (CROSSMATCH)   Result Value Ref Range    Product Code Blood Bank [id number]     Description Blood Bank Red Blood Cells, Leuko-reduced     Unit Number [id number]     Dispense Status Blood Bank transfused        RADIOLOGY:  CT ABDOMEN PELVIS W IV CONTRAST Additional Contrast? None   Final Result   1. Acute pancreatitis. 2.  Mild decrease in size of a rounded hypodensity in the pancreatic tail,   when compared to the previous study performed 06/08/2020.      3.  Small amount of pericholecystic fluid, probably secondary to the nearby   pancreatitis. 4.  Hepatomegaly again seen. Findings consistent with liver cirrhosis. 5.  Bilateral nephrolithiasis again seen. 6.  Nonobstructing 0.2 cm calculus in the distal left ureter, by the UVJ. 7.  Left renal cortical cysts again identified. EKG:  This EKG is signed and interpreted by me.     Rate: 89  Rhythm: Sinus  Interpretation: non-specific EKG, no St elevations or depression  Comparison: was normal      ------------------------- NURSING NOTES AND VITALS REVIEWED ---------------------------  Date / Time Roomed:  2/10/2022 12:42 PM  ED Bed Assignment:  [id number]    The nursing notes within the ED encounter and vital signs as below have been reviewed.      Patient Vitals for the past 24 hrs:   BP Temp Temp src Pulse Resp SpO2   02/11/22 2045 136/75 98.9 °F (37.2 °C) Oral 91 18 93 %   02/11/22 2039 -- -- -- -- -- 96 %   02/11/22 2038 -- -- -- -- -- 96 %   02/11/22 2037 -- -- -- -- -- 96 %   02/11/22 2036 -- -- -- -- -- 98 %   02/11/22 2035 -- -- -- 87 18 91 %   02/11/22 2034 -- -- -- 81 18 94 %   02/11/22 2033 -- -- -- 83 12 95 %   02/11/22 2032 (!) 151/92 -- -- 83 12 97 %   02/11/22 2031 -- -- -- 83 13 98 %   02/11/22 2030 -- -- -- 86 18 96 %   02/11/22 2029 -- -- -- 88 13 96 %   02/11/22 2028 -- -- -- 87 15 --   02/11/22 2027 -- -- -- 87 16 92 %   02/11/22 2026 -- -- -- 86 15 97 %   02/11/22 2025 -- -- -- 87 16 100 %   02/11/22 2024 -- -- -- 85 17 95 %   02/11/22 2023 -- -- -- 86 21 94 %   02/11/22 2022 (!) 152/90 -- -- 84 11 95 %   02/11/22 2021 -- -- -- 81 14 96 %   02/11/22 2020 -- -- -- 85 17 --   02/11/22 2019 -- -- -- 91 16 --   02/11/22 2018 -- -- -- 86 19 95 %   02/11/22 2017 -- -- -- 82 15 96 %   02/11/22 2016 -- -- -- 84 17 95 %   02/11/22 2015 -- -- -- 87 14 98 %   02/11/22 2014 -- -- -- 86 19 97 %   02/11/22 2013 -- -- -- 88 12 96 %   02/11/22 2012 -- -- -- 92 19 95 %   02/11/22 2011 -- -- -- 91 18 94 %   02/11/22 2010 (!) 159/89 98.7 °F (37.1 °C) Infrared 92 15 95 %   02/11/22 2008 -- -- -- 92 18 95 %   02/11/22 2007 -- -- -- 89 17 96 %   02/11/22 2006 -- -- -- 88 14 94 %   02/11/22 2005 -- -- -- 89 17 96 %   02/11/22 2004 -- -- -- 87 15 95 %   02/11/22 2003 -- -- -- 92 20 92 %   02/11/22 2002 -- -- -- 86 13 --   02/11/22 2001 -- -- -- 89 17 96 %   02/11/22 2000 (!) 147/86 -- -- 93 18 93 %   02/11/22 1955 -- -- -- 98 19 97 %   02/11/22 1954 -- -- -- 94 17 99 %   02/11/22 1953 -- -- -- 96 16 99 %   02/11/22 1952 (!) 146/80 -- -- 98 17 98 %   02/11/22 1951 -- -- -- 92 20 97 %   02/11/22 1950 -- -- -- 100 20 96 %   02/11/22 1949 -- -- -- 86 16 97 %   02/11/22 1948 -- -- -- 95 17 96 %   02/11/22 1947 -- -- -- 90 19 96 %   02/11/22 1946 -- -- -- 91 17 96 %   02/11/22 1945 -- -- -- 92 16 95 %   02/11/22 1940 119/65 97 °F (36.1 °C) Infrared 94 20 95 %   02/11/22 0815 110/60 98.6 °F (37 °C) Oral 80 14 93 %       Oxygen Saturation Interpretation: Normal    ------------------------------------------ PROGRESS NOTES ------------------------------------------  Re-evaluation(s):  Time: 4pm  Patients symptoms show no change  Repeat physical examination is not changed    Counseling:  I have spoken with the patient and discussed todays results, in addition to providing specific details for the plan of care and counseling regarding the diagnosis and prognosis. Their questions are answered at this time and they are agreeable with the plan of admission.    --------------------------------- ADDITIONAL PROVIDER NOTES ---------------------------------  Consultations:    Discussed case. They will admit the patient. This patient's ED course included: a personal history and physicial examination, re-evaluation prior to disposition, multiple bedside re-evaluations, IV medications, cardiac monitoring, continuous pulse oximetry and complex medical decision making and emergency management    This patient has remained hemodynamically stable during their ED course. Please note that the withdrawal or failure to initiate urgent interventions for this patient would likely result in a life threatening deterioration or permanent disability. Accordingly this patient received 30 minutes of critical care time, excluding separately billable procedures. Diagnosis:  1. Gastrointestinal hemorrhage, unspecified gastrointestinal hemorrhage type    2.  Anemia, unspecified type        Disposition:  Patient's disposition: Admit to telemetry  Patient's condition is stable.          Mathew Lubin MD  02/12/22 8503

## 2022-02-10 NOTE — CARE COORDINATION
SS Note: No Covid test. Pt boarded in ED, presented for abnormal labs & found to have Hemoglobin of 6.2 & requiring transfusion. Consult to surgery and Gi initiated and [facility] protocol for ETOH w/drawl. Pt had 2 admissions (June 2020 7 Oct 2018) for Alcohol-induced acute pancreatitis. SW met w/pt in ED 17 for transition of care planning. Spoke from door wearing mask/PPE and explained role. Pt is  & lives alone in [place] home. He has 24 yo Dtr. PTA, pt is independent in IADL's/ADL's, drives, works Full time and has insurance. Pt denies being admitted here for Alcohol-induced acute pancreatitis and reports he has no problem with drinking. Pt does not want to speak to PRS, stating he is fine. PCP is Xu Parada & Pharmacy is [email or web address] on [address]. Pt has following DME: none. Denies hx of HHC or LUPILLO & No hx of 02. Pt plans on returning home. No needs identified. Advance Care Planning   Healthcare Decision Maker:    Primary Decision Maker: Girish Key - Brother/Sister - [phone]    Secondary Decision Maker: VandanaRebecca - Parent - [phone]    Click here to complete Healthcare Decision Makers including selection of the Healthcare Decision Maker Relationship (ie \"Primary\"). Today we documented desired Decision Maker(s), who is (are) NOT Legal Next of Kin. ACP documents are required for decision maker authority. SW offered to complete POA for pt but he states he has one.   Electronically signed by EMILIA Francisco on 2/10/2022 at 4:58 PM

## 2022-02-10 NOTE — H&P
Department of Internal Medicine  History and Physical    PCP: Raynelle Lesch, DO  Admitting Physician: Dr. Choco Rojas  Consultants:   Date of Service: 2/10/2022    CHIEF COMPLAINT: Shortness of breath and fatigue    HISTORY OF PRESENT ILLNESS:    Patient is a 48-year-old male who presented to the ED due to increased weakness, fatigue and shortness of breath. Patient states her symptoms have been progressive over the last few months. States she has a history of hemorrhoids. He has noticed significant bleeding at times but not consistently/daily. He denies any melena. He denies any abdominal pain. He does admit to sore throat. He does admit. Patient of throat with oral intake. He does admit to increased phlegm production. States he had a colonoscopy about 5 years ago. States he had benign polyps at that time. PAST MEDICAL Hx:  Past Medical History:   Diagnosis Date    Cancer [facility])     Oral cancer    Hypertension     Kidney stone     Pancreatitis        PAST SURGICAL Hx:   Past Surgical History:   Procedure Laterality Date    KIDNEY STONE SURGERY      LYMPHADENECTOMY      Due to oral cancer. Also had part or tounge removed       FAMILY Hx:  History reviewed. No pertinent family history. HOME MEDICATIONS:  Prior to Admission medications    Medication Sig Start Date End Date Taking? Authorizing Provider   losartan (COZAAR) 25 MG tablet Take 25 mg by mouth daily   Yes Historical Provider, MD   omeprazole (PRILOSEC) 20 MG delayed release capsule Take 20 mg by mouth Daily   Yes Historical Provider, MD   clobetasol (TEMOVATE) 0.05 % cream Apply 1 each topically 2 times daily Apply topically 2 times daily. Yes Historical Provider, MD   oxymetazoline (AFRIN) 0.05 % nasal spray 2 sprays by Nasal route 2 times daily   Yes Historical Provider, MD   amLODIPine (NORVASC) 5 MG tablet Take 5 mg by mouth daily   Yes Historical Provider, MD       ALLERGIES:  Patient has no known allergies.     SOCIAL Hx:  Social History     Socioeconomic History    Marital status:      Spouse name: Not on file    Number of children: Not on file    Years of education: Not on file    Highest education level: Not on file   Occupational History    Not on file   Tobacco Use    Smoking status: Current Every Day Smoker     Packs/day: 1.00     Types: Cigarettes    Smokeless tobacco: Never Used   Vaping Use    Vaping Use: Never used   Substance and Sexual Activity    Alcohol use: Yes     Alcohol/week: 35.0 standard drinks     Types: 35 Shots of liquor per week     Comment: liquor    Drug use: Never    Sexual activity: Not on file   Other Topics Concern    Not on file   Social History Narrative    Not on file     Social Determinants of Health     Financial Resource Strain:     Difficulty of Paying Living Expenses: Not on file   Food Insecurity:     Worried About Running Out of Food in the Last Year: Not on file    Kalyn of Food in the Last Year: Not on file   Transportation Needs:     Lack of Transportation (Medical): Not on file    Lack of Transportation (Non-Medical):  Not on file   Physical Activity:     Days of Exercise per Week: Not on file    Minutes of Exercise per Session: Not on file   Stress:     Feeling of Stress : Not on file   Social Connections:     Frequency of Communication with Friends and Family: Not on file    Frequency of Social Gatherings with Friends and Family: Not on file    Attends Restoration Services: Not on file    Active Member of Clubs or Organizations: Not on file    Attends Club or Organization Meetings: Not on file    Marital Status: Not on file   Intimate Partner Violence:     Fear of Current or Ex-Partner: Not on file    Emotionally Abused: Not on file    Physically Abused: Not on file    Sexually Abused: Not on file   Housing Stability:     Unable to Pay for Housing in the Last Year: Not on file    Number of [place] in the Last Year: Not on file    Unstable Housing in the Last Year: Not on file       ROS: Positive in bold  General:   Denies chills, fatigue, fever, malaise, night sweats or weight loss    Psychological:   Denies anxiety, disorientation or hallucinations    ENT:    Denies epistaxis, headaches, vertigo or visual changes    Cardiovascular:   Denies any chest pain, irregular heartbeats, or palpitations. No paroxysmal nocturnal dyspnea. Respiratory:   Denies shortness of breath, coughing, sputum production, hemoptysis, or wheezing. No orthopnea. Gastrointestinal:   Denies nausea, vomiting, diarrhea, or constipation. Denies any abdominal pain. Denies change in bowel habits or stools. Genito-Urinary:    Denies any urgency, frequency, hematuria. Voiding without difficulty. Musculoskeletal:   Denies joint pain, joint stiffness, joint swelling or muscle pain    Neurology:    Denies any headache or focal neurological deficits. No weakness or paresthesia. Derm:    Denies any rashes, ulcers, or excoriations. Denies bruising. Extremities:   Denies any lower extremity swelling or edema. PHYSICAL EXAM: Abnormal findings noted  VITALS:  Vitals:    02/10/22 1241   BP: 130/74   Pulse: 95   Resp: 18   Temp:    SpO2: 95%         CONSTITUTIONAL:    Awake, alert, cooperative, no apparent distress, and appears stated age    EYES:     EOMI, sclera clear, conjunctiva normal    ENT:    Normocephalic, atraumatic,  External ears without lesions. NECK:    Supple, symmetrical, trachea midline,  no JVD    HEMATOLOGIC/LYMPHATICS:    No cervical lymphadenopathy and no supraclavicular lymphadenopathy    LUNGS:    Symmetric.  No increased work of breathing, good air exchange, clear to auscultation bilaterally, no wheezes, rhonchi, or rales,     CARDIOVASCULAR:    Normal apical impulse, regular rate and rhythm, normal S1 and S2, no S3 or S4, and no murmur noted    ABDOMEN:     soft, non-distended, non-tender,     MUSCULOSKELETAL:    There is no redness, warmth, or swelling of the joints. NEUROLOGIC:    Awake, alert, oriented to name, place and time. SKIN:    No bruising or bleeding. No redness, warmth, or swelling    EXTREMITIES:    Peripheral pulses present. No edema, cyanosis, or swelling. LINES/CATHETERS     LABORATORY DATA:  CBC with Differential:    Lab Results   Component Value Date    WBC 7.0 02/10/2022    RBC 3.45 02/10/2022    HGB 6.2 02/10/2022    HCT 24.3 02/10/2022     02/10/2022    MCV 70.4 02/10/2022    MCH 18.0 02/10/2022    MCHC 25.5 02/10/2022    RDW 20.7 02/10/2022    LYMPHOPCT 19.8 02/10/2022    MONOPCT 6.3 02/10/2022    BASOPCT 0.9 02/10/2022    MONOSABS 0.42 02/10/2022    LYMPHSABS 1.40 02/10/2022    EOSABS 0.25 02/10/2022    BASOSABS 0.06 02/10/2022     CMP:    Lab Results   Component Value Date     02/10/2022    K 4.0 02/10/2022     02/10/2022    CO2 20 02/10/2022    BUN 8 02/10/2022    CREATININE 0.8 02/10/2022    GFRAA >60 02/10/2022    LABGLOM >60 02/10/2022    GLUCOSE 163 02/10/2022    PROT 8.2 02/10/2022    LABALBU 4.1 02/10/2022    CALCIUM 9.4 02/10/2022    BILITOT 0.5 02/10/2022    ALKPHOS 90 02/10/2022    AST 39 02/10/2022    ALT 15 02/10/2022       ASSESSMENT/PLAN:  1. Acute anemia secondary to GI bleed  2. History of squamous cell cancer of tongue  3. Vitamin B07 and folic acid deficiency  4. Hypertension  5. History of pancreatitis  6. Current tobacco abuse  7. Alcohol dependence    Patient presented due to shortness of breath and fatigue. Patient found to have anemia. Patient transfused 1 unit PRBC. Patient placed on IV fluids. Patient placed on Protonix and octreotide drips. GI will be consulted. General surgery consulted. Patient will be placed on as needed Ativan. Continue to monitor H&H.     Lora Bai [place]  4:24 PM  2/10/2022    Electronically signed by Lora Bai DO on 2/10/22 at 4:24 PM EST

## 2022-02-10 NOTE — ED NOTES
Report called to the floor. No further questions at this time.       Pako Yao, FELIPA  02/10/22 4488

## 2022-02-11 ENCOUNTER — ANESTHESIA EVENT (OUTPATIENT)
Dept: ENDOSCOPY | Age: 47
DRG: 378 | End: 2022-02-11
Payer: COMMERCIAL

## 2022-02-11 ENCOUNTER — ANESTHESIA (OUTPATIENT)
Dept: ENDOSCOPY | Age: 47
DRG: 378 | End: 2022-02-11
Payer: COMMERCIAL

## 2022-02-11 VITALS
OXYGEN SATURATION: 93 % | DIASTOLIC BLOOD PRESSURE: 79 MMHG | SYSTOLIC BLOOD PRESSURE: 122 MMHG | RESPIRATION RATE: 21 BRPM

## 2022-02-11 LAB
ACETAMINOPHEN LEVEL: <5 MCG/ML (ref 10–30)
ALBUMIN SERPL-MCNC: 3.8 G/DL (ref 3.5–5.2)
ALP BLD-CCNC: 81 U/L (ref 40–129)
ALT SERPL-CCNC: 13 U/L (ref 0–40)
ANION GAP SERPL CALCULATED.3IONS-SCNC: 11 MMOL/L (ref 7–16)
ANISOCYTOSIS: ABNORMAL
AST SERPL-CCNC: 32 U/L (ref 0–39)
BASOPHILS ABSOLUTE: 0 E9/L (ref 0–0.2)
BASOPHILS RELATIVE PERCENT: 0.6 % (ref 0–2)
BILIRUB SERPL-MCNC: 1 MG/DL (ref 0–1.2)
BUN BLDV-MCNC: 7 MG/DL (ref 6–20)
CALCIUM SERPL-MCNC: 8.8 MG/DL (ref 8.6–10.2)
CEA: 3.3 NG/ML (ref 0–5.2)
CHLORIDE BLD-SCNC: 102 MMOL/L (ref 98–107)
CO2: 24 MMOL/L (ref 22–29)
CREAT SERPL-MCNC: 0.8 MG/DL (ref 0.7–1.2)
EKG ATRIAL RATE: 89 BPM
EKG P AXIS: 36 DEGREES
EKG P-R INTERVAL: 150 MS
EKG Q-T INTERVAL: 390 MS
EKG QRS DURATION: 100 MS
EKG QTC CALCULATION (BAZETT): 474 MS
EKG R AXIS: 31 DEGREES
EKG T AXIS: 33 DEGREES
EKG VENTRICULAR RATE: 89 BPM
EOSINOPHILS ABSOLUTE: 0.29 E9/L (ref 0.05–0.5)
EOSINOPHILS RELATIVE PERCENT: 4.4 % (ref 0–6)
ETHANOL: <10 MG/DL (ref 0–0.08)
GFR AFRICAN AMERICAN: >60
GFR NON-AFRICAN AMERICAN: >60 ML/MIN/1.73
GLUCOSE BLD-MCNC: 158 MG/DL (ref 74–99)
HCT VFR BLD CALC: 26.1 % (ref 37–54)
HCT VFR BLD CALC: 26.6 % (ref 37–54)
HCT VFR BLD CALC: 26.8 % (ref 37–54)
HCT VFR BLD CALC: 28.1 % (ref 37–54)
HEMOGLOBIN: 7.1 G/DL (ref 12.5–16.5)
HEMOGLOBIN: 7.2 G/DL (ref 12.5–16.5)
HEMOGLOBIN: 7.2 G/DL (ref 12.5–16.5)
HEMOGLOBIN: 7.3 G/DL (ref 12.5–16.5)
HEMOGLOBIN: 7.6 G/DL (ref 12.5–16.5)
HYPOCHROMIA: ABNORMAL
IMMATURE RETIC FRACT: 33.7 % (ref 2.3–13.4)
LACTIC ACID: 0.9 MMOL/L (ref 0.5–2.2)
LYMPHOCYTES ABSOLUTE: 1.11 E9/L (ref 1.5–4)
LYMPHOCYTES RELATIVE PERCENT: 16.8 % (ref 20–42)
MAGNESIUM: 1.6 MG/DL (ref 1.6–2.6)
MCH RBC QN AUTO: 19.3 PG (ref 26–35)
MCHC RBC AUTO-ENTMCNC: 26.7 % (ref 32–34.5)
MCV RBC AUTO: 72.3 FL (ref 80–99.9)
MONOCYTES ABSOLUTE: 0.26 E9/L (ref 0.1–0.95)
MONOCYTES RELATIVE PERCENT: 4.4 % (ref 2–12)
MYELOCYTE PERCENT: 0.9 % (ref 0–0)
NEUTROPHILS ABSOLUTE: 4.81 E9/L (ref 1.8–7.3)
NEUTROPHILS RELATIVE PERCENT: 73.5 % (ref 43–80)
NUCLEATED RED BLOOD CELLS: 0.9 /100 WBC
OVALOCYTES: ABNORMAL
PDW BLD-RTO: 21.5 FL (ref 11.5–15)
PHOSPHORUS: 3.5 MG/DL (ref 2.5–4.5)
PLATELET # BLD: 161 E9/L (ref 130–450)
PMV BLD AUTO: 9.8 FL (ref 7–12)
POIKILOCYTES: ABNORMAL
POLYCHROMASIA: ABNORMAL
POTASSIUM SERPL-SCNC: 4.3 MMOL/L (ref 3.5–5)
RBC # BLD: 3.68 E12/L (ref 3.8–5.8)
RETIC HGB EQUIVALENT: 14.8 PG (ref 28.2–36.6)
RETICULOCYTE ABSOLUTE COUNT: 0.05 E12/L
RETICULOCYTE COUNT PCT: 1.4 % (ref 0.4–1.9)
SALICYLATE, SERUM: <0.3 MG/DL (ref 0–30)
SODIUM BLD-SCNC: 137 MMOL/L (ref 132–146)
TARGET CELLS: ABNORMAL
TOTAL PROTEIN: 7.7 G/DL (ref 6.4–8.3)
TRICYCLIC ANTIDEPRESSANTS SCREEN SERUM: NEGATIVE NG/ML
TSH SERPL DL<=0.05 MIU/L-ACNC: 1.21 UIU/ML (ref 0.27–4.2)
WBC # BLD: 6.5 E9/L (ref 4.5–11.5)

## 2022-02-11 PROCEDURE — 2580000003 HC RX 258: Performed by: INTERNAL MEDICINE

## 2022-02-11 PROCEDURE — 83735 ASSAY OF MAGNESIUM: CPT

## 2022-02-11 PROCEDURE — 36415 COLL VENOUS BLD VENIPUNCTURE: CPT

## 2022-02-11 PROCEDURE — 84443 ASSAY THYROID STIM HORMONE: CPT

## 2022-02-11 PROCEDURE — 1200000000 HC SEMI PRIVATE

## 2022-02-11 PROCEDURE — 85018 HEMOGLOBIN: CPT

## 2022-02-11 PROCEDURE — 6360000002 HC RX W HCPCS: Performed by: INTERNAL MEDICINE

## 2022-02-11 PROCEDURE — 82378 CARCINOEMBRYONIC ANTIGEN: CPT

## 2022-02-11 PROCEDURE — 2580000003 HC RX 258: Performed by: NURSE ANESTHETIST, CERTIFIED REGISTERED

## 2022-02-11 PROCEDURE — 84100 ASSAY OF PHOSPHORUS: CPT

## 2022-02-11 PROCEDURE — 80053 COMPREHEN METABOLIC PANEL: CPT

## 2022-02-11 PROCEDURE — 7100000011 HC PHASE II RECOVERY - ADDTL 15 MIN: Performed by: INTERNAL MEDICINE

## 2022-02-11 PROCEDURE — 0DJ08ZZ INSPECTION OF UPPER INTESTINAL TRACT, VIA NATURAL OR ARTIFICIAL OPENING ENDOSCOPIC: ICD-10-PCS | Performed by: INTERNAL MEDICINE

## 2022-02-11 PROCEDURE — 83605 ASSAY OF LACTIC ACID: CPT

## 2022-02-11 PROCEDURE — 3700000001 HC ADD 15 MINUTES (ANESTHESIA): Performed by: INTERNAL MEDICINE

## 2022-02-11 PROCEDURE — C9113 INJ PANTOPRAZOLE SODIUM, VIA: HCPCS | Performed by: INTERNAL MEDICINE

## 2022-02-11 PROCEDURE — 7100000010 HC PHASE II RECOVERY - FIRST 15 MIN: Performed by: INTERNAL MEDICINE

## 2022-02-11 PROCEDURE — 85014 HEMATOCRIT: CPT

## 2022-02-11 PROCEDURE — 85045 AUTOMATED RETICULOCYTE COUNT: CPT

## 2022-02-11 PROCEDURE — 6370000000 HC RX 637 (ALT 250 FOR IP): Performed by: INTERNAL MEDICINE

## 2022-02-11 PROCEDURE — 86301 IMMUNOASSAY TUMOR CA 19-9: CPT

## 2022-02-11 PROCEDURE — 3609017100 HC EGD: Performed by: INTERNAL MEDICINE

## 2022-02-11 PROCEDURE — 6360000002 HC RX W HCPCS: Performed by: NURSE ANESTHETIST, CERTIFIED REGISTERED

## 2022-02-11 PROCEDURE — 93010 ELECTROCARDIOGRAM REPORT: CPT | Performed by: INTERNAL MEDICINE

## 2022-02-11 PROCEDURE — 3700000000 HC ANESTHESIA ATTENDED CARE: Performed by: INTERNAL MEDICINE

## 2022-02-11 PROCEDURE — 2709999900 HC NON-CHARGEABLE SUPPLY: Performed by: INTERNAL MEDICINE

## 2022-02-11 PROCEDURE — 85025 COMPLETE CBC W/AUTO DIFF WBC: CPT

## 2022-02-11 RX ORDER — PROPOFOL 10 MG/ML
INJECTION, EMULSION INTRAVENOUS PRN
Status: DISCONTINUED | OUTPATIENT
Start: 2022-02-11 | End: 2022-02-11 | Stop reason: SDUPTHER

## 2022-02-11 RX ORDER — FOLIC ACID 1 MG/1
1 TABLET ORAL DAILY
Status: DISCONTINUED | OUTPATIENT
Start: 2022-02-11 | End: 2022-02-13 | Stop reason: HOSPADM

## 2022-02-11 RX ORDER — CYANOCOBALAMIN 1000 UG/ML
1000 INJECTION INTRAMUSCULAR; SUBCUTANEOUS DAILY
Status: DISCONTINUED | OUTPATIENT
Start: 2022-02-12 | End: 2022-02-13 | Stop reason: HOSPADM

## 2022-02-11 RX ORDER — LORAZEPAM 1 MG/1
1 TABLET ORAL EVERY 4 HOURS PRN
Status: DISCONTINUED | OUTPATIENT
Start: 2022-02-11 | End: 2022-02-13 | Stop reason: HOSPADM

## 2022-02-11 RX ORDER — LORAZEPAM 2 MG/ML
1 INJECTION INTRAMUSCULAR EVERY 4 HOURS PRN
Status: DISCONTINUED | OUTPATIENT
Start: 2022-02-11 | End: 2022-02-13 | Stop reason: HOSPADM

## 2022-02-11 RX ORDER — SODIUM CHLORIDE 9 MG/ML
INJECTION, SOLUTION INTRAVENOUS CONTINUOUS PRN
Status: DISCONTINUED | OUTPATIENT
Start: 2022-02-11 | End: 2022-02-11 | Stop reason: SDUPTHER

## 2022-02-11 RX ORDER — GAUZE BANDAGE 2" X 2"
100 BANDAGE TOPICAL DAILY
Status: DISCONTINUED | OUTPATIENT
Start: 2022-02-11 | End: 2022-02-13 | Stop reason: HOSPADM

## 2022-02-11 RX ORDER — PANTOPRAZOLE SODIUM 40 MG/1
40 TABLET, DELAYED RELEASE ORAL
Status: CANCELLED | OUTPATIENT
Start: 2022-02-12

## 2022-02-11 RX ORDER — CYANOCOBALAMIN 1000 UG/ML
1000 INJECTION INTRAMUSCULAR; SUBCUTANEOUS ONCE
Status: COMPLETED | OUTPATIENT
Start: 2022-02-11 | End: 2022-02-11

## 2022-02-11 RX ORDER — METHYLPREDNISOLONE SODIUM SUCCINATE 125 MG/2ML
80 INJECTION, POWDER, LYOPHILIZED, FOR SOLUTION INTRAMUSCULAR; INTRAVENOUS ONCE
Status: COMPLETED | OUTPATIENT
Start: 2022-02-11 | End: 2022-02-11

## 2022-02-11 RX ORDER — GABAPENTIN 100 MG/1
100 CAPSULE ORAL 3 TIMES DAILY
Status: DISCONTINUED | OUTPATIENT
Start: 2022-02-11 | End: 2022-02-13 | Stop reason: HOSPADM

## 2022-02-11 RX ADMIN — SODIUM CHLORIDE: 9 INJECTION, SOLUTION INTRAVENOUS at 00:11

## 2022-02-11 RX ADMIN — OCTREOTIDE ACETATE 50 MCG/HR: 100 INJECTION, SOLUTION INTRAVENOUS; SUBCUTANEOUS at 10:20

## 2022-02-11 RX ADMIN — LORAZEPAM 1 MG: 2 INJECTION INTRAMUSCULAR; INTRAVENOUS at 10:31

## 2022-02-11 RX ADMIN — FOLIC ACID 1 MG: 1 TABLET ORAL at 09:34

## 2022-02-11 RX ADMIN — GABAPENTIN 100 MG: 100 CAPSULE ORAL at 13:39

## 2022-02-11 RX ADMIN — NYSTATIN 500000 UNITS: 100000 SUSPENSION ORAL at 22:22

## 2022-02-11 RX ADMIN — PROPOFOL 50 MG: 10 INJECTION, EMULSION INTRAVENOUS at 19:31

## 2022-02-11 RX ADMIN — PROPOFOL 50 MG: 10 INJECTION, EMULSION INTRAVENOUS at 19:30

## 2022-02-11 RX ADMIN — NYSTATIN 500000 UNITS: 100000 SUSPENSION ORAL at 17:16

## 2022-02-11 RX ADMIN — SODIUM CHLORIDE: 9 INJECTION, SOLUTION INTRAVENOUS at 19:21

## 2022-02-11 RX ADMIN — GABAPENTIN 100 MG: 100 CAPSULE ORAL at 22:22

## 2022-02-11 RX ADMIN — CYANOCOBALAMIN 1000 MCG: 1000 INJECTION, SOLUTION INTRAMUSCULAR; SUBCUTANEOUS at 06:00

## 2022-02-11 RX ADMIN — SODIUM CHLORIDE 125 MG: 9 INJECTION, SOLUTION INTRAVENOUS at 13:40

## 2022-02-11 RX ADMIN — SODIUM CHLORIDE: 9 INJECTION, SOLUTION INTRAVENOUS at 06:01

## 2022-02-11 RX ADMIN — METHYLPREDNISOLONE SODIUM SUCCINATE 80 MG: 125 INJECTION, POWDER, FOR SOLUTION INTRAMUSCULAR; INTRAVENOUS at 13:39

## 2022-02-11 RX ADMIN — NYSTATIN 500000 UNITS: 100000 SUSPENSION ORAL at 13:40

## 2022-02-11 RX ADMIN — THIAMINE HCL TAB 100 MG 100 MG: 100 TAB at 11:57

## 2022-02-11 RX ADMIN — PROPOFOL 50 MG: 10 INJECTION, EMULSION INTRAVENOUS at 19:33

## 2022-02-11 RX ADMIN — LORAZEPAM 1 MG: 1 TABLET ORAL at 22:22

## 2022-02-11 RX ADMIN — PROPOFOL 50 MG: 10 INJECTION, EMULSION INTRAVENOUS at 19:32

## 2022-02-11 RX ADMIN — SODIUM CHLORIDE 8 MG/HR: 9 INJECTION, SOLUTION INTRAVENOUS at 06:45

## 2022-02-11 RX ADMIN — NYSTATIN 500000 UNITS: 100000 SUSPENSION ORAL at 09:33

## 2022-02-11 RX ADMIN — OCTREOTIDE ACETATE 50 MCG/HR: 100 INJECTION, SOLUTION INTRAVENOUS; SUBCUTANEOUS at 00:20

## 2022-02-11 ASSESSMENT — LIFESTYLE VARIABLES: SMOKING_STATUS: 1

## 2022-02-11 ASSESSMENT — PAIN SCALES - GENERAL: PAINLEVEL_OUTOF10: 0

## 2022-02-11 NOTE — PROGRESS NOTES
Internal Medicine Progress Note    [facility]    Montyjone Ceci. Sola Pirce., BRITTANYOElliottI. Darryl Holt D.O., IVAN Dutton D.O. Malick Valdez, MSN, APRN, NP-C  Jagruti Rodriguez. Sheldon Roque, MSN, APRN-CNP     Primary Care Physician: Orlin Oswald DO   Admitting Physician:  Robert Castro DO  Admission date and time: 2/10/2022 12:42 PM    Room:  [address] [place] [address]  Admitting diagnosis: GI bleed [K92.2]  Gastrointestinal hemorrhage, unspecified gastrointestinal hemorrhage type [K92.2]  Anemia, unspecified type [D64.9]    Patient Name: Yaneli Meyer  MRN: [id number]    Date of Service: 2/11/2022     Subjective:  Pema Gotti is a 51 y.o. male who was seen and examined today,2/11/2022, at the bedside. Patient still complaining of some melena. Results of lab tests were discussed with him and findings suggesting significant iron deficiency. Patient is scheduled for upper GI panendoscopy today. Have discussed his alcohol consumption    No family present during my examination. Review of System:   Constitutional:   Denies fever or chills, weight loss or gain, fatigue or malaise. HEENT:   Denies ear pain, sore throat, sinus or eye problems. Cardiovascular:   Denies any chest pain, irregular heartbeats, or palpitations. Respiratory:   Denies shortness of breath, coughing, sputum production, hemoptysis, or wheezing. Gastrointestinal:   Complain of slight nausea does relate to having melena with occasional hematochezia and bright red rectal bleeding  Genitourinary:    Denies any urgency, frequency, hematuria. Voiding  without difficulty. Extremities:   Denies lower extremity swelling, edema or cyanosis. Neurology:    Denies any headache or focal neurological deficits, Denies generalized weakness or memory difficulty. Psch:   Denies being anxious or depressed. Musculoskeletal:    Denies  myalgias, joint complaints or back pain.    Integumentary:   Denies any rashes, ulcers, or excoriations. Denies bruising. Hematologic/Lymphatic:  Denies bruising or bleeding. Physical Exam:  No intake/output data recorded. Intake/Output Summary (Last 24 hours) at 2/11/2022 1600  Last data filed at 2/11/2022 0644  Gross per 24 hour   Intake 1806.67 ml   Output 300 ml   Net 1506.67 ml   I/O last 3 completed shifts: In: 1806.7 [I.V.:1100; Blood:706.7]  Out: 300 [Urine:300]  Patient Vitals for the past 96 hrs (Last 3 readings):   Weight   02/10/22 1241 230 lb (104.3 kg)     Vital Signs:   Blood pressure 110/60, pulse 80, temperature 98.6 °F (37 °C), temperature source Oral, resp. rate 14, height 5' 7\" (1.702 m), weight 230 lb (104.3 kg), SpO2 93 %. General appearance:  Alert, responsive, oriented to person, place, and time. Well preserved, alert, no distress. Head:  Normocephalic. No masses, lesions or tenderness. Eyes:  PERRLA. EOMI. Sclera clear. Buccal mucosa moist.  ENT:  Ears normal. Mucosa normal.  Neck:    Supple. Trachea midline. No thyromegaly. No JVD. No bruits. Heart:    Rhythm regular. Rate controlled. No murmurs. Lungs:    Symmetrical. Clear to auscultation bilaterally. No wheezes. No rhonchi. No rales. Abdomen:   Soft. Non-tender. Non-distended. Bowel sounds positive. No organomegaly or masses. No pain on palpation. Extremities:    Peripheral pulses present. No peripheral edema. No ulcers. No cyanosis. No clubbing. Neurologic:    Alert x 3. No focal deficit. Cranial nerves grossly intact. No focal weakness. Psych:   Behavior is normal. Mood appears normal. Speech is not rapid and/or pressured. Musculoskeletal:   Spine ROM normal. Muscular strength intact. Gait not assessed. Integumentary:  No rashes  Skin normal color and texture.   Genitalia/Breast:  Deferred    Medication:  Scheduled Meds:   folic acid  1 mg Oral Daily    gabapentin  100 mg Oral TID    thiamine mononitrate  100 mg Oral Daily    ferric gluconate (FERRLECIT) IVPB  125 mg IntraVENous Daily    sodium chloride flush  5-40 mL IntraVENous 2 times per day    nystatin  5 mL Oral 4x Daily     Continuous Infusions:   sodium chloride      octreotide (SANDOSTATIN) infusion 50 mcg/hr (02/11/22 1020)    dextrose      sodium chloride      pantoprozole (PROTONIX) infusion 8 mg/hr (02/11/22 0645)    sodium chloride 75 mL/hr at 02/11/22 0601       Objective Data:  CBC with Differential:    Lab Results   Component Value Date    WBC 6.5 02/11/2022    RBC 3.68 02/11/2022    HGB 7.2 02/11/2022    HCT 26.8 02/11/2022     02/11/2022    MCV 72.3 02/11/2022    MCH 19.3 02/11/2022    MCHC 26.7 02/11/2022    RDW 21.5 02/11/2022    NRBC 0.9 02/11/2022    LYMPHOPCT 16.8 02/11/2022    MONOPCT 4.4 02/11/2022    MYELOPCT 0.9 02/11/2022    BASOPCT 0.6 02/11/2022    MONOSABS 0.26 02/11/2022    LYMPHSABS 1.11 02/11/2022    EOSABS 0.29 02/11/2022    BASOSABS 0.00 02/11/2022     CMP:    Lab Results   Component Value Date     02/11/2022    K 4.3 02/11/2022    K 4.0 02/10/2022     02/11/2022    CO2 24 02/11/2022    BUN 7 02/11/2022    CREATININE 0.8 02/11/2022    GFRAA >60 02/11/2022    LABGLOM >60 02/11/2022    GLUCOSE 158 02/11/2022    PROT 7.7 02/11/2022    LABALBU 3.8 02/11/2022    CALCIUM 8.8 02/11/2022    BILITOT 1.0 02/11/2022    ALKPHOS 81 02/11/2022    AST 32 02/11/2022    ALT 13 02/11/2022              Assessment:    · Acute anemia secondary to GI bleed  · History of squamous cell cancer of tongue  · Vitamin X30 and folic acid deficiency  · Hypertension  · History of pancreatitis  · Current tobacco abuse  · Alcohol dependence  · B12 and folate deficiency        Plan:       · Patient being transfuse accordingly. Evidence of severe iron deficiency being present. Will start on IV iron  · Continue PPIs with endoscopy planned for today. Probably will need colonoscopic exam  · History of daily alcohol consumption. Will give vitamin supplementation. Start Neurontin.   As needed Ativan  · Discussed alcohol consumption due to CAT scan showing hepatomegaly  · Recommend outpatient sleep study  · We will start on B12 supplementation intramuscularly  · Add folic acid    More than 50% of my  time was spent at the bedside counseling/coordinating care with the patient and/or family with face to face contact. This time was spent reviewing notes and laboratory data as well as instructing and counseling the patient. Time I spent with the family or surrogate(s) is included only if the patient was incapable of providing the necessary information or participating in medical decisions. I also discussed the differential diagnosis and all of the proposed management plans with the patient and individuals accompanying the patient. [facility] requires this high level of physician care and nursing on the Telemetry unit due the complexity of decision management and chance of rapid decline or death. Continued cardiac monitoring and higher level of nursing are required. I am readily available for any further decision-making and intervention.      Olimpia Ochoa DO, F.A.C.O.I.  2/11/2022  4:00 PM

## 2022-02-11 NOTE — CONSULTS
GENERAL SURGERY  CONSULT NOTE  2/11/2022    Physician Consulted: Dr. Priscilla Cummins  Reason for Consult: GI bleed  Referring Physician: Dr. Raudel Ding    FERMÍN Leal is a 51 y.o. male who presents for evaluation of fatigue. He states he has felt this way for at least a month and when he arrived to the ED his hemoglobin was found to be 6. 2. he received one unit PRBCs and responded appropriately to 7. 2. he has a history of small hemorrhoids that bleed every few months but he denies any recent bleeding. He does admit to GERD symptoms daily for which he attempts over the counter antacids. He is a daily drinker of 6-8 drinks per day. Denies heavy NSAID use. Last EGD and colonoscopy were about 5 years ago. He had a few small polyps but not sure if any gastritis or peptic ulcers were identified. He does not take any home anticoagulation. He is hemodynamically stable. CT scan revealed evidence of possible acute pancreatitis but he is not complaining of any abdominal pain. There is also evidence of cirrhosis. Past Medical History:   Diagnosis Date    Cancer [facility])     Oral cancer    Hypertension     Kidney stone     Pancreatitis        Past Surgical History:   Procedure Laterality Date    KIDNEY STONE SURGERY      LYMPHADENECTOMY      Due to oral cancer. Also had part or tounge removed       Medications Prior to Admission:    Prior to Admission medications    Medication Sig Start Date End Date Taking? Authorizing Provider   losartan (COZAAR) 25 MG tablet Take 25 mg by mouth daily   Yes Historical Provider, MD   omeprazole (PRILOSEC) 20 MG delayed release capsule Take 20 mg by mouth Daily   Yes Historical Provider, MD   clobetasol (TEMOVATE) 0.05 % cream Apply 1 each topically 2 times daily Apply topically 2 times daily.    Yes Historical Provider, MD   oxymetazoline (AFRIN) 0.05 % nasal spray 2 sprays by Nasal route 2 times daily   Yes Historical Provider, MD   amLODIPine (NORVASC) 5 MG tablet Take 5 mg by mouth daily   Yes Historical Provider, MD       No Known Allergies    History reviewed. No pertinent family history. Social History     Tobacco Use    Smoking status: Current Every Day Smoker     Packs/day: 1.00     Types: Cigarettes    Smokeless tobacco: Never Used   Vaping Use    Vaping Use: Never used   Substance Use Topics    Alcohol use: Yes     Alcohol/week: 35.0 standard drinks     Types: 35 Shots of liquor per week     Comment: liquor    Drug use: Never         Review of Systems   General ROS: positive for  - fatigue  Hematological and Lymphatic ROS: negative  Respiratory ROS: no cough, shortness of breath, or wheezing  Cardiovascular ROS: no chest pain or dyspnea on exertion  Gastrointestinal ROS: positive for - blood in stools  Genito-Urinary ROS: no dysuria, trouble voiding, or hematuria  Musculoskeletal ROS: negative      PHYSICAL EXAM:    Vitals:    02/11/22 0815   BP: 110/60   Pulse: 80   Resp: 14   Temp: 98.6 °F (37 °C)   SpO2: 93%       General Appearance:  awake, alert, oriented, in no acute distress  Skin:  Skin color, texture, turgor normal. No rashes or lesions. Head/face:  NCAT  Eyes:  No gross abnormalities. Lungs:  Normal expansion. Clear to auscultation. Heart:  Heart regular rate   Abdomen:  Soft, non-tender, non-distended  Extremities: Extremities warm to touch, pink, with no edema. LABS:    CBC  Recent Labs     02/10/22  1338 02/10/22  2010 02/11/22  0110   WBC 7.0  --   --    HGB 6.2*   < > 7.2*   HCT 24.3*   < > 26.8*     --   --     < > = values in this interval not displayed. BMP  Recent Labs     02/10/22  1338      K 4.0      CO2 20*   BUN 8   CREATININE 0.8   CALCIUM 9.4     Liver Function  Recent Labs     02/10/22  1338 02/10/22  2010   LIPASE  --  20   BILITOT 0.5  --    AST 39  --    ALT 15  --    ALKPHOS 90  --    PROT 8.2  --    LABALBU 4.1  --      No results for input(s): LACTATE in the last 72 hours.   Recent Labs     02/10/22  1657   INR 1.2 RADIOLOGY    CT ABDOMEN PELVIS W IV CONTRAST Additional Contrast? None    Result Date: 2/10/2022  EXAMINATION: CT OF THE ABDOMEN AND PELVIS WITH CONTRAST 2/10/2022 5:31 pm TECHNIQUE: CT of the abdomen and pelvis was performed with the administration of intravenous contrast. Multiplanar reformatted images are provided for review. Dose modulation, iterative reconstruction, and/or weight based adjustment of the mA/kV was utilized to reduce the radiation dose to as low as reasonably achievable. COMPARISON: The previous study performed 06/08/2020. HISTORY: ORDERING SYSTEM PROVIDED HISTORY: anemia, cirrhosis? epigastric pain, pancreatitis TECHNOLOGIST PROVIDED HISTORY: Reason for exam:->anemia, cirrhosis? epigastric pain, pancreatitis Additional Contrast?->None Decision Support Exception - unselect if not a suspected or confirmed emergency medical condition->Emergency Medical Condition (MA) FINDINGS: Lower Chest: The lung bases are clear. Organs: There is again hepatomegaly. The liver exhibits a heterogeneous density with alternating areas of decreased attenuation and normal density parenchyma. Rule out fatty infiltration versus hepatocellular disease. There is a fine nodularity to the liver contour. These findings are all indicate cirrhosis. The spleen, biliary tree, and adrenal glands are unremarkable. The gallbladder is unremarkable in appearance, however, small amount of pericholecystic fluid is again identified. There is again noted to be induration and linear stranding in the fat planes about the pancreatic head, with mild extension towards the pancreatic body, shaheed hepatis, and left anterior pararenal space. The inflammatory process is overall slightly less prominent than seen previously. Regardless, the findings indicate acute pancreatitis. There is no evidence of pancreatic necrosis or gross pancreatic duct obstruction.   There has been a mild decrease in size of a rounded hypodensity in the pancreatic tail. It measures 0.8 cm. It previously measured 1.3 cm. The pericholecystic fluid is probably secondary to the nearby pancreatitis. Left renal cortical cysts are again identified. Bilateral nephrolithiasis is again noted. GI/Bowel: The stomach is suboptimally distended, but grossly unremarkable. The small and large bowel are normal in appearance for a non oral contrast study. The appendix is visualized and normal in appearance. Pelvis: The urinary bladder is adequately distended and unremarkable. The seminal vesicles are symmetric in size. The prostate gland is unremarkable. There is a 0.2 cm calculus within the distal left ureter, by the ureterovesical junction. There is mild dilatation of the mid to distal left ureter, however, there is no evidence of hydronephrosis. This mild mid to distal left ureteral dilatation can be seen on the prior study and no calculus was identified within the distal ureter at that time. This represents a nonobstructing calculus. Peritoneum/Retroperitoneum: No retroperitoneal or pelvic lymphadenopathy is appreciated. No free fluid is identified in the pelvis. No abdominal or pelvic soft tissue mass is noted. The abdominal aorta is again mildly atherosclerotic. Bones/Soft Tissues: The visualized osseous structures are unremarkable for the patient's age. 1.  Acute pancreatitis. 2.  Mild decrease in size of a rounded hypodensity in the pancreatic tail, when compared to the previous study performed 06/08/2020. 3.  Small amount of pericholecystic fluid, probably secondary to the nearby pancreatitis. 4.  Hepatomegaly again seen. Findings consistent with liver cirrhosis. 5.  Bilateral nephrolithiasis again seen. 6.  Nonobstructing 0.2 cm calculus in the distal left ureter, by the UVJ. 7.  Left renal cortical cysts again identified.          ASSESSMENT:  51 y.o. male with anemia, possible GI bleeding     PLAN:  - GI services also consulted by primary team so we will defer any endoscopic evaluation to their team given concern for cirrhosis   - continue PPI  - monitor H/H, transfuse as necessary    Discussed with Dr. Con Mosqueda    Electronically signed by Jose Alberto Mcintyre MD on 2/11/22 at 8:40 AM EST    As above. Plan per GI  Will see as needed.

## 2022-02-11 NOTE — CONSULTS
[facility]  Dr. Ayah Garcia M.D., Dr. Johnna Pena M.D., Dr. Moe Torres D.O., Dr. Claudia Rojas M.D., Dr. Danielle Lucas D.O. Patient Name: Jocelynn Samuels  MRN: [id number]  : 1975 (51 y.o. male)  Allergies: has No Known Allergies. Date of Service: 2022       Reason for Consultation:  \"gi bleed\"    HISTORY OF PRESENT ILLNESS:      The patient is a 51 y.o. male who presents with fatigue, and anemia seen on outpatient labs. Patient reports that he went for routine evaluation with his PCP for his generalized fatigue and somnolence, and on lab work-up he was found to have a low hemoglobin, he was called with instructions to go to the emergency room for possible transfusion. Today, patient reports continued fatigue, he does report occasional hematochezia, specifically bright red blood when he wipes, and rarely bright red blood staining the toilet water. He denies melena, or hematemesis. He has had this issue for years, and he attributes it to history of hemorrhoids. Patient underwent EGD and colonoscopy in [facility] 5 or 6 years ago. Patient denies any history of known liver disease. He does endorse drinking \"3 good size cocktails\" daily for many years. He also endorses using cocaine a few times a week for his fatigue. He denies any IV drug use including no history of IV drug use. He denies NSAID use, corticosteroid use, or antiplatelets or anticoagulants. This fatigue is been ongoing for 3 to 4 months. He also endorses a 30 to 40 pound weight gain in the last 3 to 4 months, associated with increased abdominal circumference, and peripheral edema.     REVIEW OF SYSTEMS:   Review of Systems   12 point ROS obtained and negative except as in HPI    Past Medical History:  Past Medical History:   Diagnosis Date    Cancer ([facility] [address]     Oral cancer    Class 2 severe obesity due to excess calories with serious comorbidity and body mass index (BMI) of 36.0 to 36.9 in adult [facility])     Hypertension     Kidney stone     Pancreatitis     Psoriasis     Renal calculi        Past Surgical History:  Past Surgical History:   Procedure Laterality Date    KIDNEY STONE SURGERY      LYMPHADENECTOMY      Due to oral cancer. Also had part or tounge removed       Home Medications:  Prior to Admission medications    Medication Sig Start Date End Date Taking? Authorizing Provider   losartan (COZAAR) 25 MG tablet Take 25 mg by mouth daily   Yes Historical Provider, MD   omeprazole (PRILOSEC) 20 MG delayed release capsule Take 20 mg by mouth Daily   Yes Historical Provider, MD   clobetasol (TEMOVATE) 0.05 % cream Apply 1 each topically 2 times daily Apply topically 2 times daily. Yes Historical Provider, MD   oxymetazoline (AFRIN) 0.05 % nasal spray 2 sprays by Nasal route 2 times daily   Yes Historical Provider, MD   amLODIPine (NORVASC) 5 MG tablet Take 5 mg by mouth daily   Yes Historical Provider, MD       Allergies: Patient has no known allergies. Social History:  Social History     Socioeconomic History    Marital status: Single     Spouse name: Not on file    Number of children: 2    Years of education: Not on file    Highest education level: Not on file   Occupational History    Occupation: mechanical    Tobacco Use    Smoking status: Current Every Day Smoker     Packs/day: 1.00     Types: Cigarettes    Smokeless tobacco: Never Used   Vaping Use    Vaping Use: Never used   Substance and Sexual Activity    Alcohol use:  Yes     Alcohol/week: 35.0 standard drinks     Types: 35 Shots of liquor per week     Comment: 4-6 cocktails daily    Drug use: Never    Sexual activity: Not on file   Other Topics Concern    Not on file   Social History Narrative    Not on file     Social Determinants of Health     Financial Resource Strain:     Difficulty of Paying Living Expenses: Not on file   Food Insecurity:     Worried About [address] in the Last Year: Not on file    Ran Out of Food in the Last Year: Not on file   Transportation Needs:     Lack of Transportation (Medical): Not on file    Lack of Transportation (Non-Medical): Not on file   Physical Activity:     Days of Exercise per Week: Not on file    Minutes of Exercise per Session: Not on file   Stress:     Feeling of Stress : Not on file   Social Connections:     Frequency of Communication with Friends and Family: Not on file    Frequency of Social Gatherings with Friends and Family: Not on file    Attends Yarsani Services: Not on file    Active Member of Clubs or Organizations: Not on file    Attends Club or Organization Meetings: Not on file    Marital Status: Not on file   Intimate Partner Violence:     Fear of Current or Ex-Partner: Not on file    Emotionally Abused: Not on file    Physically Abused: Not on file    Sexually Abused: Not on file   Housing Stability:     Unable to Pay for Housing in the Last Year: Not on file    Number of [place] in the Last Year: Not on file    Unstable Housing in the Last Year: Not on file       Family History:  Family History   Problem Relation Age of Onset    Other Mother         Valvular disease    Coronary Art Dis Father          PHYSICAL EXAM:  Vital Signs: /60 Comment: manual  Pulse 80   Temp 98.6 °F (37 °C) (Oral)   Resp 14   Ht 5' 7\" (1.702 m)   Wt 230 lb (104.3 kg)   SpO2 93%   BMI 36.02 kg/m²   GENERAL APPEARANCE:  awake, alert, oriented, cooperative, and in no acute distress  EYES:  Lids and lashes normal, sclera clear  HENT:  Normocephalic, without obvious abnormality  LUNGS:  Clear to auscultation bilaterally. No increased work of breathing, good air exchange. CARDIOVASCULAR: Regular rate and rhythm. Trace peripheral pitting edema in ankles. ABDOMEN:  Distended, soft, nontender  MUSCULOSKELETAL:  There is no redness, warmth, or swelling of the joints. EXTREMITIES: No edema  NEUROLOGIC:  Awake, alert, oriented to name, place and time.   SKIN: Normal skin color, no rashes  PSYCH: Affect, behavior and insight are all within normal limits.       DATA:  Results for orders placed or performed during the hospital encounter of 02/10/22   Strep screen group a throat    Specimen: Throat swab   Result Value Ref Range    Strep Grp A PCR Negative Negative   CBC auto differential   Result Value Ref Range    WBC 7.0 4.5 - 11.5 E9/L    RBC 3.45 (L) 3.80 - 5.80 E12/L    Hemoglobin 6.2 (LL) 12.5 - 16.5 g/dL    Hematocrit 24.3 (L) 37.0 - 54.0 %    MCV 70.4 (L) 80.0 - 99.9 fL    MCH 18.0 (L) 26.0 - 35.0 pg    MCHC 25.5 (L) 32.0 - 34.5 %    RDW 20.7 (H) 11.5 - 15.0 fL    Platelets 753 723 - 701 E9/L    MPV 10.1 7.0 - 12.0 fL    Neutrophils % 69.4 43.0 - 80.0 %    Lymphocytes % 19.8 (L) 20.0 - 42.0 %    Monocytes % 6.3 2.0 - 12.0 %    Eosinophils % 3.6 0.0 - 6.0 %    Basophils % 0.9 0.0 - 2.0 %    Neutrophils Absolute 4.83 1.80 - 7.30 E9/L    Lymphocytes Absolute 1.40 (L) 1.50 - 4.00 E9/L    Monocytes Absolute 0.42 0.10 - 0.95 E9/L    Eosinophils Absolute 0.25 0.05 - 0.50 E9/L    Basophils Absolute 0.06 0.00 - 0.20 E9/L    Anisocytosis 2+     Hypochromia 3+     Poikilocytes 1+     Ovalocytes 1+     Target Cells 1+    Comprehensive Metabolic Panel w/ Reflex to MG   Result Value Ref Range    Sodium 137 132 - 146 mmol/L    Potassium reflex Magnesium 4.0 3.5 - 5.0 mmol/L    Chloride 101 98 - 107 mmol/L    CO2 20 (L) 22 - 29 mmol/L    Anion Gap 16 7 - 16 mmol/L    Glucose 163 (H) 74 - 99 mg/dL    BUN 8 6 - 20 mg/dL    CREATININE 0.8 0.7 - 1.2 mg/dL    GFR Non-African American >60 >=60 mL/min/1.73    GFR African American >60     Calcium 9.4 8.6 - 10.2 mg/dL    Total Protein 8.2 6.4 - 8.3 g/dL    Albumin 4.1 3.5 - 5.2 g/dL    Total Bilirubin 0.5 0.0 - 1.2 mg/dL    Alkaline Phosphatase 90 40 - 129 U/L    ALT 15 0 - 40 U/L    AST 39 0 - 39 U/L   Troponin   Result Value Ref Range    Troponin, High Sensitivity 7 0 - 11 ng/L   Ferritin   Result Value Ref Range    Ferritin 7 ng/mL   Iron and TIBC Result Value Ref Range    Iron 20 (L) 59 - 158 mcg/dL    TIBC 544 (H) 250 - 450 mcg/dL    Iron Saturation 4 (L) 20 - 55 %   Vitamin B12 & Folate   Result Value Ref Range    Vitamin B-12 195 (L) 211 - 946 pg/mL    Folate 4.5 (L) 4.8 - 24.2 ng/mL   Lactate dehydrogenase   Result Value Ref Range     135 - 225 U/L   Urinalysis with Microscopic   Result Value Ref Range    Color, UA Yellow Straw/Yellow    Clarity, UA Clear Clear    Glucose, Ur Negative Negative mg/dL    Bilirubin Urine Negative Negative    Ketones, Urine Negative Negative mg/dL    Specific Gravity, UA 1.015 1.005 - 1.030    Blood, Urine TRACE (A) Negative    pH, UA 7.5 5.0 - 9.0    Protein, UA Negative Negative mg/dL    Urobilinogen, Urine 0.2 <2.0 E.U./dL    Nitrite, Urine Negative Negative    Leukocyte Esterase, Urine TRACE (A) Negative    WBC, UA 1-3 0 - 5 /HPF    RBC, UA NONE 0 - 2 /HPF    Epithelial Cells, UA NONE SEEN /HPF    Bacteria, UA RARE (A) None Seen /HPF   Lactic Acid, Plasma   Result Value Ref Range    Lactic Acid 2.3 (H) 0.5 - 2.2 mmol/L   Serum Drug Screen   Result Value Ref Range    Ethanol Lvl <10 mg/dL    Acetaminophen Level <5.0 (L) 10.0 - 54.7 mcg/mL    Salicylate, Serum <2.0 0.0 - 30.0 mg/dL    TCA Scrn NEGATIVE Cutoff:300 ng/mL   Protime-INR   Result Value Ref Range    Protime 13.9 (H) 9.3 - 12.4 sec    INR 1.2    Lipase   Result Value Ref Range    Lipase 20 13 - 60 U/L   Hemoglobin and Hematocrit, Blood   Result Value Ref Range    Hemoglobin 6.1 (LL) 12.5 - 16.5 g/dL    Hematocrit 23.7 (L) 37.0 - 54.0 %   Comprehensive Metabolic Panel   Result Value Ref Range    Sodium 137 132 - 146 mmol/L    Potassium 4.3 3.5 - 5.0 mmol/L    Chloride 102 98 - 107 mmol/L    CO2 24 22 - 29 mmol/L    Anion Gap 11 7 - 16 mmol/L    Glucose 158 (H) 74 - 99 mg/dL    BUN 7 6 - 20 mg/dL    CREATININE 0.8 0.7 - 1.2 mg/dL    GFR Non-African American >60 >=60 mL/min/1.73    GFR African American >60     Calcium 8.8 8.6 - 10.2 mg/dL    Total Protein 7.7 6.4 - 8.3 g/dL    Albumin 3.8 3.5 - 5.2 g/dL    Total Bilirubin 1.0 0.0 - 1.2 mg/dL    Alkaline Phosphatase 81 40 - 129 U/L    ALT 13 0 - 40 U/L    AST 32 0 - 39 U/L   CBC Auto Differential   Result Value Ref Range    WBC 6.5 4.5 - 11.5 E9/L    RBC 3.68 (L) 3.80 - 5.80 E12/L    Hemoglobin 7.1 (L) 12.5 - 16.5 g/dL    Hematocrit 26.6 (L) 37.0 - 54.0 %    MCV 72.3 (L) 80.0 - 99.9 fL    MCH 19.3 (L) 26.0 - 35.0 pg    MCHC 26.7 (L) 32.0 - 34.5 %    RDW 21.5 (H) 11.5 - 15.0 fL    Platelets 691 706 - 156 E9/L    MPV 9.8 7.0 - 12.0 fL    Neutrophils % 73.5 43.0 - 80.0 %    Lymphocytes % 16.8 (L) 20.0 - 42.0 %    Monocytes % 4.4 2.0 - 12.0 %    Eosinophils % 4.4 0.0 - 6.0 %    Basophils % 0.6 0.0 - 2.0 %    Neutrophils Absolute 4.81 1.80 - 7.30 E9/L    Lymphocytes Absolute 1.11 (L) 1.50 - 4.00 E9/L    Monocytes Absolute 0.26 0.10 - 0.95 E9/L    Eosinophils Absolute 0.29 0.05 - 0.50 E9/L    Basophils Absolute 0.00 0.00 - 0.20 E9/L    Myelocyte Percent 0.9 0 - 0 %    nRBC 0.9 /100 WBC    Anisocytosis 1+     Polychromasia 2+     Hypochromia 2+     Poikilocytes 1+     Ovalocytes 1+     Target Cells 1+    Magnesium   Result Value Ref Range    Magnesium 1.6 1.6 - 2.6 mg/dL   Phosphorus   Result Value Ref Range    Phosphorus 3.5 2.5 - 4.5 mg/dL   TSH without Reflex   Result Value Ref Range    TSH 1.210 0.270 - 4.200 uIU/mL   Hemoglobin and hematocrit, blood   Result Value Ref Range    Hemoglobin 7.2 (L) 12.5 - 16.5 g/dL    Hematocrit 26.8 (L) 37.0 - 54.0 %   LACTIC ACID, PLASMA   Result Value Ref Range    Lactic Acid 0.9 0.5 - 2.2 mmol/L   Reticulocytes   Result Value Ref Range    Retic Ct Pct 1.4 0.4 - 1.9 %    Retic Ct Abs 0.051 E12/L    Immature Retic Fract 33.7 (H) 2.3 - 13.4 %    Hematocrit 26.1 (L) 37.0 - 54.0 %    Retic HGB Equivalent 14.8 (L) 28.2 - 36.6 pg   POCT occult blood stool   Result Value Ref Range    OCCULT BLOOD FECAL positive     QC OK? ok    EKG 12 Lead   Result Value Ref Range    Ventricular Rate 89 BPM Atrial Rate 89 BPM    P-R Interval 150 ms    QRS Duration 100 ms    Q-T Interval 390 ms    QTc Calculation (Bazett) 474 ms    P Axis 36 degrees    R Axis 31 degrees    T Axis 33 degrees   TYPE AND SCREEN   Result Value Ref Range    ABO/Rh O POS     Antibody Screen NEG    PREPARE RBC (CROSSMATCH)   Result Value Ref Range    Product Code Blood Bank [id number]     Description Blood Bank Red Blood Cells, Leuko-reduced     Unit Number [id number]     Dispense Status Blood Bank transfused          IMAGING:  CT ABDOMEN PELVIS W IV CONTRAST Additional Contrast? None    Result Date: 2/10/2022  EXAMINATION: CT OF THE ABDOMEN AND PELVIS WITH CONTRAST 2/10/2022 5:31 pm TECHNIQUE: CT of the abdomen and pelvis was performed with the administration of intravenous contrast. Multiplanar reformatted images are provided for review. Dose modulation, iterative reconstruction, and/or weight based adjustment of the mA/kV was utilized to reduce the radiation dose to as low as reasonably achievable. COMPARISON: The previous study performed 06/08/2020. HISTORY: ORDERING SYSTEM PROVIDED HISTORY: anemia, cirrhosis? epigastric pain, pancreatitis TECHNOLOGIST PROVIDED HISTORY: Reason for exam:->anemia, cirrhosis? epigastric pain, pancreatitis Additional Contrast?->None Decision Support Exception - unselect if not a suspected or confirmed emergency medical condition->Emergency Medical Condition (MA) FINDINGS: Lower Chest: The lung bases are clear. Organs: There is again hepatomegaly. The liver exhibits a heterogeneous density with alternating areas of decreased attenuation and normal density parenchyma. Rule out fatty infiltration versus hepatocellular disease. There is a fine nodularity to the liver contour. These findings are all indicate cirrhosis. The spleen, biliary tree, and adrenal glands are unremarkable. The gallbladder is unremarkable in appearance, however, small amount of pericholecystic fluid is again identified.  There is again noted to be induration and linear stranding in the fat planes about the pancreatic head, with mild extension towards the pancreatic body, shaheed hepatis, and left anterior pararenal space. The inflammatory process is overall slightly less prominent than seen previously. Regardless, the findings indicate acute pancreatitis. There is no evidence of pancreatic necrosis or gross pancreatic duct obstruction. There has been a mild decrease in size of a rounded hypodensity in the pancreatic tail. It measures 0.8 cm. It previously measured 1.3 cm. The pericholecystic fluid is probably secondary to the nearby pancreatitis. Left renal cortical cysts are again identified. Bilateral nephrolithiasis is again noted. GI/Bowel: The stomach is suboptimally distended, but grossly unremarkable. The small and large bowel are normal in appearance for a non oral contrast study. The appendix is visualized and normal in appearance. Pelvis: The urinary bladder is adequately distended and unremarkable. The seminal vesicles are symmetric in size. The prostate gland is unremarkable. There is a 0.2 cm calculus within the distal left ureter, by the ureterovesical junction. There is mild dilatation of the mid to distal left ureter, however, there is no evidence of hydronephrosis. This mild mid to distal left ureteral dilatation can be seen on the prior study and no calculus was identified within the distal ureter at that time. This represents a nonobstructing calculus. Peritoneum/Retroperitoneum: No retroperitoneal or pelvic lymphadenopathy is appreciated. No free fluid is identified in the pelvis. No abdominal or pelvic soft tissue mass is noted. The abdominal aorta is again mildly atherosclerotic. Bones/Soft Tissues: The visualized osseous structures are unremarkable for the patient's age. 1.  Acute pancreatitis.  2.  Mild decrease in size of a rounded hypodensity in the pancreatic tail, when compared to the previous study performed 06/08/2020. 3.  Small amount of pericholecystic fluid, probably secondary to the nearby pancreatitis. 4.  Hepatomegaly again seen. Findings consistent with liver cirrhosis. 5.  Bilateral nephrolithiasis again seen. 6.  Nonobstructing 0.2 cm calculus in the distal left ureter, by the UVJ. 7.  Left renal cortical cysts again identified. ASSESSMENT and PLAN:    # Microcytic anemia  # Hematochezia  - Most likely iron deficiency anemia related to chronic blood loss/hematochezia  - CT findings and history concerning for cirrhosis  - Patient is hemodynamically stable  - Considering history, agree with IV PPI and IV octreotide  - Plan for EGD today to rule out PUD and esophageal varices  - Trend H&H q6hr and transfuse per primary    # Abnormal CT scan  -Patient has history of recurrent acute pancreatitis  -Currently without abdominal pain, lipase not elevated  -Check tumor markers considering presence of pancreatic mass  -Mass is smaller in size, most likely sequelae of prior pancreatitis  -Consider referral to advanced endoscopy for outpatient EUS    Please see orders for further plan of care. Will discuss above with Dr. Zuly Santos DO  GI fellow  2/11/2022 at 11:25 AM      Patient seen and independently examined. Pertinent notes and lab work reviewed. Discussed with Dr. Linda Che with physical exam and A&P. Discussed with patient - all questions answered - agreeable with the plan as delineated. Thank you for the opportunity to see this patient in consultation. Shea Unger MD  2/11/2022  1:59 PM      Patient seen and examined independently. Discussed with fellow and agree with the plan of care stated above.     Meri Walsh DO  2/11/2022  2:49 PM

## 2022-02-11 NOTE — ANESTHESIA PRE PROCEDURE
Department of Anesthesiology  Preprocedure Note       Name:  Enoc Emanuel   Age:  51 y.o.  :  1975                                          MRN:  [id number]         Date:  2022      Surgeon: Garrison Baum):  Meagan Luna DO    Procedure: Procedure(s):  EGD ESOPHAGOGASTRODUODENOSCOPY    Medications prior to admission:   Prior to Admission medications    Medication Sig Start Date End Date Taking? Authorizing Provider   losartan (COZAAR) 25 MG tablet Take 25 mg by mouth daily   Yes Historical Provider, MD   omeprazole (PRILOSEC) 20 MG delayed release capsule Take 20 mg by mouth Daily   Yes Historical Provider, MD   clobetasol (TEMOVATE) 0.05 % cream Apply 1 each topically 2 times daily Apply topically 2 times daily.    Yes Historical Provider, MD   oxymetazoline (AFRIN) 0.05 % nasal spray 2 sprays by Nasal route 2 times daily   Yes Historical Provider, MD   amLODIPine (NORVASC) 5 MG tablet Take 5 mg by mouth daily   Yes Historical Provider, MD       Current medications:    Current Facility-Administered Medications   Medication Dose Route Frequency Provider Last Rate Last Admin    LORazepam (ATIVAN) injection 1 mg  1 mg IntraVENous Q4H PRN Ivis Brisk, DO   1 mg at 22 1031    Or    LORazepam (ATIVAN) tablet 1 mg  1 mg Oral Q4H PRN Ivis Brisk, DO        folic acid (FOLVITE) tablet 1 mg  1 mg Oral Daily Ivis Brisk, DO   1 mg at 22 0664    gabapentin (NEURONTIN) capsule 100 mg  100 mg Oral TID Ricky Ochoa DO   100 mg at 22 1339    thiamine mononitrate tablet 100 mg  100 mg Oral Daily Alcides Ochoa DO   100 mg at 22 1157    ferric gluconate (FERRLECIT) 125 mg in sodium chloride 0.9 % 100 mL IVPB  125 mg IntraVENous Daily Alcides Ochoa DO   Stopped at 22 1440    [START ON 2022] cyanocobalamin injection 1,000 mcg  1,000 mcg IntraMUSCular Daily Alcides Ochoa DO        0.9 % sodium chloride infusion   IntraVENous PRN Ivis Brisk, DO        octreotide (SANDOSTATIN) 500 mcg in sodium chloride 0.9 % 100 mL infusion  50 mcg/hr IntraVENous Continuous Earnstine Hair, DO 10 mL/hr at 02/11/22 1020 50 mcg/hr at 02/11/22 1020    glucose (GLUTOSE) 40 % oral gel 15 g  15 g Oral PRN Earnstine Hair, DO        glucagon (rDNA) injection 1 mg  1 mg IntraMUSCular PRN Earnstine Hair, DO        dextrose 5 % solution  100 mL/hr IntraVENous PRN Earnstine Hair, DO        sodium chloride flush 0.9 % injection 5-40 mL  5-40 mL IntraVENous 2 times per day Earnstine Hair, DO   10 mL at 02/10/22 2214    sodium chloride flush 0.9 % injection 5-40 mL  5-40 mL IntraVENous PRN Earnstine Hair, DO        0.9 % sodium chloride infusion  25 mL IntraVENous PRN Earnstine Hair, DO        ondansetron (ZOFRAN-ODT) disintegrating tablet 4 mg  4 mg Oral Q8H PRN Earnstine Hair, DO        Or    ondansetron (ZOFRAN) injection 4 mg  4 mg IntraVENous Q6H PRN Earnstine Hair, DO        polyethylene glycol (GLYCOLAX) packet 17 g  17 g Oral Daily PRN Earnstine Hair, DO        acetaminophen (TYLENOL) tablet 650 mg  650 mg Oral Q6H PRN Earnstine Hair, DO        Or    acetaminophen (TYLENOL) suppository 650 mg  650 mg Rectal Q6H PRN Earnstine Hair, DO        pantoprazole (PROTONIX) 80 mg in sodium chloride 0.9 % 100 mL infusion  8 mg/hr IntraVENous Continuous Earnstine Hair, DO 10 mL/hr at 02/11/22 0645 8 mg/hr at 02/11/22 0645    0.9 % sodium chloride infusion   IntraVENous Continuous Earnstine Hair, DO 75 mL/hr at 02/11/22 0601 New Bag at 02/11/22 0601    nystatin (MYCOSTATIN) [id number] UNIT/ML suspension 500,000 Units  5 mL Oral 4x Daily Earnstine Hair, DO   500,000 Units at 02/11/22 1716    dextrose bolus (hypoglycemia) 10% 125 mL  125 mL IntraVENous PRN Earnstine Hair, DO        Or    dextrose bolus (hypoglycemia) 10% 250 mL  250 mL IntraVENous PRN Earnstine Hair, DO           Allergies:  No Known Allergies    Problem List:    Patient Active Problem List   Diagnosis Code  GI bleed K92.2       Past Medical History:        Diagnosis Date    Cancer [facility])     Oral cancer    Class 2 severe obesity due to excess calories with serious comorbidity and body mass index (BMI) of 36.0 to 36.9 in adult [facility])     Hypertension     Kidney stone     Pancreatitis     Psoriasis     Renal calculi        Past Surgical History:        Procedure Laterality Date    KIDNEY STONE SURGERY      LYMPHADENECTOMY      Due to oral cancer. Also had part or tounge removed       Social History:    Social History     Tobacco Use    Smoking status: Current Every Day Smoker     Packs/day: 1.00     Types: Cigarettes    Smokeless tobacco: Never Used   Substance Use Topics    Alcohol use: Yes     Alcohol/week: 35.0 standard drinks     Types: 35 Shots of liquor per week     Comment: 4-6 cocktails daily                                Ready to quit: Not Answered  Counseling given: Not Answered      Vital Signs (Current):   Vitals:    02/10/22 2200 02/10/22 2216 02/11/22 0009 02/11/22 0815   BP:  131/78 128/79 110/60   Pulse: 89 93 91 80   Resp:   18 14   Temp: 98.9 °F (37.2 °C)  98.8 °F (37.1 °C) 98.6 °F (37 °C)   TempSrc:   Oral Oral   SpO2:   95% 93%   Weight:       Height:                                                  BP Readings from Last 3 Encounters:   02/11/22 110/60   06/08/20 (!) 164/106   10/23/18 (!) 201/135       NPO Status:  GREATER THAN 8 HOURS                                                                               BMI:   Wt Readings from Last 3 Encounters:   02/10/22 230 lb (104.3 kg)   06/08/20 205 lb (93 kg)   10/23/18 190 lb (86.2 kg)     Body mass index is 36.02 kg/m².     CBC:   Lab Results   Component Value Date    WBC 6.5 02/11/2022    RBC 3.68 02/11/2022    HGB 7.6 02/11/2022    HCT 28.1 02/11/2022    MCV 72.3 02/11/2022    RDW 21.5 02/11/2022     02/11/2022       CMP:   Lab Results   Component Value Date     02/11/2022    K 4.3 02/11/2022    K 4.0 02/10/2022    CL 102 02/11/2022    CO2 24 02/11/2022    BUN 7 02/11/2022    CREATININE 0.8 02/11/2022    GFRAA >60 02/11/2022    LABGLOM >60 02/11/2022    GLUCOSE 158 02/11/2022    PROT 7.7 02/11/2022    CALCIUM 8.8 02/11/2022    BILITOT 1.0 02/11/2022    ALKPHOS 81 02/11/2022    AST 32 02/11/2022    ALT 13 02/11/2022       POC Tests: No results for input(s): POCGLU, POCNA, POCK, POCCL, POCBUN, POCHEMO, POCHCT in the last 72 hours. Coags:   Lab Results   Component Value Date    PROTIME 13.9 02/10/2022    INR 1.2 02/10/2022       HCG (If Applicable): No results found for: PREGTESTUR, PREGSERUM, HCG, HCGQUANT     ABGs: No results found for: PHART, PO2ART, GCY1GPZ, IID6HAU, BEART, U3PLUVSY     Type & Screen (If Applicable):  No results found for: LABABO, LABRH    Drug/Infectious Status (If Applicable):  No results found for: HIV, HEPCAB    COVID-19 Screening (If Applicable): No results found for: COVID19        Anesthesia Evaluation  Patient summary reviewed no history of anesthetic complications:   Airway: Mallampati: III  TM distance: >3 FB   Neck ROM: full  Mouth opening: > = 3 FB Dental:          Pulmonary: breath sounds clear to auscultation  (+) current smoker                           Cardiovascular:    (+) hypertension:,         Rhythm: regular             Beta Blocker:  Not on Beta Blocker         Neuro/Psych:   (+) psychiatric history:             ROS comment: ALCOHOL ABUSE  GI/Hepatic/Renal:   (+) GERD:,          ROS comment: GI BLEED  HISTORY OF PANCREATITIS . Endo/Other:    (+) malignancy/cancer (ORAL). Abdominal:             Vascular: negative vascular ROS. Other Findings:           Anesthesia Plan      MAC     ASA 3       Induction: intravenous. MIPS: Prophylactic antiemetics administered. Anesthetic plan and risks discussed with patient. Plan discussed with CRNA.             Jose Antonio Hernandez,    2/11/2022

## 2022-02-11 NOTE — CARE COORDINATION
SS Note: No Covid testing. Per ED  assessment note pt was independent and employed prior to admission. Pt declined to meet with [facility] as pt denied alcohol abuse issues. Pt plans to return home upon discharge, denies any needs.   Electronically signed by EMILIA Coulter on 2/11/2022 at 9:52 AM

## 2022-02-12 LAB
ALBUMIN SERPL-MCNC: 3.8 G/DL (ref 3.5–5.2)
ALP BLD-CCNC: 76 U/L (ref 40–129)
ALT SERPL-CCNC: 17 U/L (ref 0–40)
ANION GAP SERPL CALCULATED.3IONS-SCNC: 12 MMOL/L (ref 7–16)
ANISOCYTOSIS: ABNORMAL
AST SERPL-CCNC: 46 U/L (ref 0–39)
BASOPHILS ABSOLUTE: 0 E9/L (ref 0–0.2)
BASOPHILS RELATIVE PERCENT: 0 % (ref 0–2)
BILIRUB SERPL-MCNC: 0.8 MG/DL (ref 0–1.2)
BLOOD BANK DISPENSE STATUS: NORMAL
BLOOD BANK DISPENSE STATUS: NORMAL
BLOOD BANK PRODUCT CODE: NORMAL
BLOOD BANK PRODUCT CODE: NORMAL
BPU ID: NORMAL
BPU ID: NORMAL
BUN BLDV-MCNC: 11 MG/DL (ref 6–20)
CALCIUM SERPL-MCNC: 8.7 MG/DL (ref 8.6–10.2)
CHLORIDE BLD-SCNC: 102 MMOL/L (ref 98–107)
CO2: 22 MMOL/L (ref 22–29)
CREAT SERPL-MCNC: 0.8 MG/DL (ref 0.7–1.2)
DESCRIPTION BLOOD BANK: NORMAL
DESCRIPTION BLOOD BANK: NORMAL
EOSINOPHILS ABSOLUTE: 0 E9/L (ref 0.05–0.5)
EOSINOPHILS RELATIVE PERCENT: 0 % (ref 0–6)
GFR AFRICAN AMERICAN: >60
GFR NON-AFRICAN AMERICAN: >60 ML/MIN/1.73
GLUCOSE BLD-MCNC: 218 MG/DL (ref 74–99)
HBA1C MFR BLD: 5.8 % (ref 4–5.6)
HCT VFR BLD CALC: 26.4 % (ref 37–54)
HCT VFR BLD CALC: 29.3 % (ref 37–54)
HCT VFR BLD CALC: 30.4 % (ref 37–54)
HEMOGLOBIN: 7 G/DL (ref 12.5–16.5)
HEMOGLOBIN: 8.1 G/DL (ref 12.5–16.5)
HEMOGLOBIN: 8.6 G/DL (ref 12.5–16.5)
HYPOCHROMIA: ABNORMAL
LYMPHOCYTES ABSOLUTE: 0.87 E9/L (ref 1.5–4)
LYMPHOCYTES RELATIVE PERCENT: 13 % (ref 20–42)
MCH RBC QN AUTO: 19.3 PG (ref 26–35)
MCHC RBC AUTO-ENTMCNC: 26.5 % (ref 32–34.5)
MCV RBC AUTO: 72.7 FL (ref 80–99.9)
MONOCYTES ABSOLUTE: 0.34 E9/L (ref 0.1–0.95)
MONOCYTES RELATIVE PERCENT: 5 % (ref 2–12)
NEUTROPHILS ABSOLUTE: 5.49 E9/L (ref 1.8–7.3)
NEUTROPHILS RELATIVE PERCENT: 82 % (ref 43–80)
NUCLEATED RED BLOOD CELLS: 1 /100 WBC
OVALOCYTES: ABNORMAL
PDW BLD-RTO: 21.6 FL (ref 11.5–15)
PLATELET # BLD: 171 E9/L (ref 130–450)
PMV BLD AUTO: 10.4 FL (ref 7–12)
POIKILOCYTES: ABNORMAL
POLYCHROMASIA: ABNORMAL
POTASSIUM SERPL-SCNC: 4 MMOL/L (ref 3.5–5)
RBC # BLD: 3.63 E12/L (ref 3.8–5.8)
SODIUM BLD-SCNC: 136 MMOL/L (ref 132–146)
TARGET CELLS: ABNORMAL
TEAR DROP CELLS: ABNORMAL
TOTAL PROTEIN: 7.7 G/DL (ref 6.4–8.3)
WBC # BLD: 6.7 E9/L (ref 4.5–11.5)

## 2022-02-12 PROCEDURE — 1200000000 HC SEMI PRIVATE

## 2022-02-12 PROCEDURE — 6370000000 HC RX 637 (ALT 250 FOR IP): Performed by: NURSE PRACTITIONER

## 2022-02-12 PROCEDURE — 83036 HEMOGLOBIN GLYCOSYLATED A1C: CPT

## 2022-02-12 PROCEDURE — 6370000000 HC RX 637 (ALT 250 FOR IP): Performed by: HOSPITALIST

## 2022-02-12 PROCEDURE — 80053 COMPREHEN METABOLIC PANEL: CPT

## 2022-02-12 PROCEDURE — 85018 HEMOGLOBIN: CPT

## 2022-02-12 PROCEDURE — 6360000002 HC RX W HCPCS: Performed by: INTERNAL MEDICINE

## 2022-02-12 PROCEDURE — 36415 COLL VENOUS BLD VENIPUNCTURE: CPT

## 2022-02-12 PROCEDURE — 36430 TRANSFUSION BLD/BLD COMPNT: CPT

## 2022-02-12 PROCEDURE — 2580000003 HC RX 258: Performed by: INTERNAL MEDICINE

## 2022-02-12 PROCEDURE — 6370000000 HC RX 637 (ALT 250 FOR IP): Performed by: INTERNAL MEDICINE

## 2022-02-12 PROCEDURE — 85025 COMPLETE CBC W/AUTO DIFF WBC: CPT

## 2022-02-12 PROCEDURE — 85014 HEMATOCRIT: CPT

## 2022-02-12 RX ORDER — POLYETHYLENE GLYCOL 3350, SODIUM CHLORIDE, SODIUM BICARBONATE, POTASSIUM CHLORIDE 420; 11.2; 5.72; 1.48 G/4L; G/4L; G/4L; G/4L
4000 POWDER, FOR SOLUTION ORAL ONCE
Status: COMPLETED | OUTPATIENT
Start: 2022-02-12 | End: 2022-02-12

## 2022-02-12 RX ORDER — POLYETHYLENE GLYCOL 3350, SODIUM CHLORIDE, SODIUM BICARBONATE, POTASSIUM CHLORIDE 420; 11.2; 5.72; 1.48 G/4L; G/4L; G/4L; G/4L
4000 POWDER, FOR SOLUTION ORAL ONCE
Status: DISCONTINUED | OUTPATIENT
Start: 2022-02-13 | End: 2022-02-12

## 2022-02-12 RX ORDER — ONDANSETRON 2 MG/ML
4 INJECTION INTRAMUSCULAR; INTRAVENOUS ONCE
Status: DISCONTINUED | OUTPATIENT
Start: 2022-02-12 | End: 2022-02-13 | Stop reason: HOSPADM

## 2022-02-12 RX ORDER — ONDANSETRON 4 MG/1
4 TABLET, ORALLY DISINTEGRATING ORAL ONCE
Status: DISCONTINUED | OUTPATIENT
Start: 2022-02-12 | End: 2022-02-13 | Stop reason: HOSPADM

## 2022-02-12 RX ORDER — SODIUM CHLORIDE 9 MG/ML
INJECTION, SOLUTION INTRAVENOUS PRN
Status: DISCONTINUED | OUTPATIENT
Start: 2022-02-12 | End: 2022-02-13 | Stop reason: HOSPADM

## 2022-02-12 RX ORDER — PANTOPRAZOLE SODIUM 40 MG/1
40 TABLET, DELAYED RELEASE ORAL
Qty: 30 TABLET | Refills: 0 | Status: SHIPPED | OUTPATIENT
Start: 2022-02-12 | End: 2022-02-13

## 2022-02-12 RX ORDER — FOLIC ACID 1 MG/1
1 TABLET ORAL DAILY
Qty: 30 TABLET | Refills: 0 | Status: SHIPPED | OUTPATIENT
Start: 2022-02-12 | End: 2022-02-13

## 2022-02-12 RX ORDER — POLYETHYLENE GLYCOL 3350 17 G/17G
17 POWDER, FOR SOLUTION ORAL DAILY PRN
Qty: 527 G | Refills: 1 | COMMUNITY
Start: 2022-02-12 | End: 2022-02-13

## 2022-02-12 RX ORDER — THIAMINE MONONITRATE (VIT B1) 100 MG
100 TABLET ORAL DAILY
Qty: 30 TABLET | Refills: 0 | Status: SHIPPED | OUTPATIENT
Start: 2022-02-12 | End: 2022-02-13

## 2022-02-12 RX ORDER — PANTOPRAZOLE SODIUM 40 MG/1
40 TABLET, DELAYED RELEASE ORAL
Status: DISCONTINUED | OUTPATIENT
Start: 2022-02-12 | End: 2022-02-13 | Stop reason: HOSPADM

## 2022-02-12 RX ADMIN — THIAMINE HCL TAB 100 MG 100 MG: 100 TAB at 09:07

## 2022-02-12 RX ADMIN — NYSTATIN 500000 UNITS: 100000 SUSPENSION ORAL at 13:32

## 2022-02-12 RX ADMIN — GABAPENTIN 100 MG: 100 CAPSULE ORAL at 21:32

## 2022-02-12 RX ADMIN — CYANOCOBALAMIN 1000 MCG: 1000 INJECTION, SOLUTION INTRAMUSCULAR; SUBCUTANEOUS at 09:07

## 2022-02-12 RX ADMIN — LORAZEPAM 1 MG: 2 INJECTION INTRAMUSCULAR; INTRAVENOUS at 09:08

## 2022-02-12 RX ADMIN — NYSTATIN 500000 UNITS: 100000 SUSPENSION ORAL at 16:35

## 2022-02-12 RX ADMIN — GABAPENTIN 100 MG: 100 CAPSULE ORAL at 13:32

## 2022-02-12 RX ADMIN — PANTOPRAZOLE SODIUM 40 MG: 40 TABLET, DELAYED RELEASE ORAL at 09:07

## 2022-02-12 RX ADMIN — LORAZEPAM 1 MG: 2 INJECTION INTRAMUSCULAR; INTRAVENOUS at 23:53

## 2022-02-12 RX ADMIN — NYSTATIN 500000 UNITS: 100000 SUSPENSION ORAL at 09:07

## 2022-02-12 RX ADMIN — BISACODYL 10 MG: 5 TABLET, COATED ORAL at 16:23

## 2022-02-12 RX ADMIN — Medication 10 ML: at 09:13

## 2022-02-12 RX ADMIN — SODIUM CHLORIDE 125 MG: 9 INJECTION, SOLUTION INTRAVENOUS at 14:11

## 2022-02-12 RX ADMIN — Medication 5 ML: at 21:34

## 2022-02-12 RX ADMIN — POLYETHYLENE GLYCOL 3350, SODIUM CHLORIDE, SODIUM BICARBONATE, POTASSIUM CHLORIDE 4000 ML: 420; 11.2; 5.72; 1.48 POWDER, FOR SOLUTION ORAL at 17:58

## 2022-02-12 RX ADMIN — FOLIC ACID 1 MG: 1 TABLET ORAL at 09:07

## 2022-02-12 RX ADMIN — NYSTATIN 500000 UNITS: 100000 SUSPENSION ORAL at 21:32

## 2022-02-12 RX ADMIN — GABAPENTIN 100 MG: 100 CAPSULE ORAL at 09:07

## 2022-02-12 ASSESSMENT — PAIN SCALES - GENERAL
PAINLEVEL_OUTOF10: 0

## 2022-02-12 NOTE — PROGRESS NOTES
bruising. Hematologic/Lymphatic:  Denies bruising or bleeding. Physical Exam:  No intake/output data recorded. Intake/Output Summary (Last 24 hours) at 2/12/2022 1617  Last data filed at 2/12/2022 0751  Gross per 24 hour   Intake 0 ml   Output --   Net 0 ml   I/O last 3 completed shifts: In: 1806.7 [I.V.:1100; Blood:706.7]  Out: -   Patient Vitals for the past 96 hrs (Last 3 readings):   Weight   02/10/22 1241 230 lb (104.3 kg)     Vital Signs:   Blood pressure 120/68, pulse 86, temperature 97.7 °F (36.5 °C), temperature source Oral, resp. rate 18, height 5' 7\" (1.702 m), weight 230 lb (104.3 kg), SpO2 97 %. General appearance:  Alert, responsive, oriented to person, place, and time. Well preserved, alert, no distress. Head:  Normocephalic. No masses, lesions or tenderness. Eyes:  PERRLA. EOMI. Sclera clear. Buccal mucosa moist.  ENT:  Ears normal. Mucosa normal.  Neck:    Supple. Trachea midline. No thyromegaly. No JVD. No bruits. Heart:    Rhythm regular. Rate controlled. No murmurs. Lungs:    Symmetrical. Clear to auscultation bilaterally. No wheezes. No rhonchi. No rales. Abdomen:   Soft. Non-tender. Non-distended. Bowel sounds positive. No organomegaly or masses. No pain on palpation. Extremities:    Peripheral pulses present. No peripheral edema. No ulcers. No cyanosis. No clubbing. Neurologic:    Alert x 3. No focal deficit. Cranial nerves grossly intact. No focal weakness. Psych:   Behavior is normal. Mood appears normal. Speech is not rapid and/or pressured. Musculoskeletal:   Spine ROM normal. Muscular strength intact. Gait not assessed. Integumentary:  No rashes  Skin normal color and texture.   Genitalia/Breast:  Deferred    Medication:  Scheduled Meds:   pantoprazole  40 mg Oral QAM AC    [START ON 2/13/2022] polyethylene glycol-electrolytes  4,000 mL Oral Once    ondansetron  4 mg IntraVENous Once    Or    ondansetron  4 mg Oral Once    bisacodyl  10 mg Oral Once    folic acid  1 mg Oral Daily    gabapentin  100 mg Oral TID    thiamine mononitrate  100 mg Oral Daily    ferric gluconate (FERRLECIT) IVPB  125 mg IntraVENous Daily    cyanocobalamin  1,000 mcg IntraMUSCular Daily    sodium chloride flush  5-40 mL IntraVENous 2 times per day    nystatin  5 mL Oral 4x Daily     Continuous Infusions:   sodium chloride      sodium chloride      dextrose      sodium chloride         Objective Data:  CBC with Differential:    Lab Results   Component Value Date    WBC 6.7 02/12/2022    RBC 3.63 02/12/2022    HGB 8.1 02/12/2022    HCT 29.3 02/12/2022     02/12/2022    MCV 72.7 02/12/2022    MCH 19.3 02/12/2022    MCHC 26.5 02/12/2022    RDW 21.6 02/12/2022    NRBC 1.0 02/12/2022    LYMPHOPCT 13.0 02/12/2022    MONOPCT 5.0 02/12/2022    MYELOPCT 0.9 02/11/2022    BASOPCT 0.0 02/12/2022    MONOSABS 0.34 02/12/2022    LYMPHSABS 0.87 02/12/2022    EOSABS 0.00 02/12/2022    BASOSABS 0.00 02/12/2022     CMP:    Lab Results   Component Value Date     02/12/2022    K 4.0 02/12/2022    K 4.0 02/10/2022     02/12/2022    CO2 22 02/12/2022    BUN 11 02/12/2022    CREATININE 0.8 02/12/2022    GFRAA >60 02/12/2022    LABGLOM >60 02/12/2022    GLUCOSE 218 02/12/2022    PROT 7.7 02/12/2022    LABALBU 3.8 02/12/2022    CALCIUM 8.7 02/12/2022    BILITOT 0.8 02/12/2022    ALKPHOS 76 02/12/2022    AST 46 02/12/2022    ALT 17 02/12/2022       Assessment:  1. Acute blood loss anemia in the setting of esophageal varices complicated by mild portal gastritis  2. Chronic pancreatitis in the setting of extensive alcohol abuse/dependence  3. History of squamous cell cancer of tongue  4. Vitamin Y21 and folic acid deficiency  5. Hypertension  6. Current tobacco abuse      Plan:   The patient would prefer to have inpatient colonoscopy and this has been scheduled for tomorrow. His hemoglobin has stabilized following additional packed red blood cell transfusion.   No further GI bleeding is identified. No alcohol withdrawal is identified. The patient's issues are directly related to his ongoing alcohol dependence and we strongly suggested alcohol cessation. We will continue to monitor serial hemoglobins with plans for discharge home following colonoscopic evaluation tomorrow. I have discussed case with the GI team.    More than 50% of my  time was spent at the bedside counseling/coordinating care with the patient and/or family with face to face contact. This time was spent reviewing notes and laboratory data as well as instructing and counseling the patient. Time I spent with the family or surrogate(s) is included only if the patient was incapable of providing the necessary information or participating in medical decisions. I also discussed the differential diagnosis and all of the proposed management plans with the patient and individuals accompanying the patient. Izabella Marie requires this high level of physician care and nursing on the Telemetry unit due the complexity of decision management and chance of rapid decline or death. Continued cardiac monitoring and higher level of nursing are required. I am readily available for any further decision-making and intervention.      Chiara Ott DO, [email or web address].  2/12/2022  4:17 PM

## 2022-02-12 NOTE — PROGRESS NOTES
PROGRESS NOTE  By Shea Unger M.D. [facility]  Dr. Mitchell Coronado M.D.,  Dr. Promise Damon M.D.,   Dr. Cas Cortes D.O.,  Dr. Pete France M.D.,  Dr. Carter, D.O.,  RENETTA CastO. Natasha Ross  51 y.o.  male    SUBJECTIVE:  Denies any significant abdominal pain. Denies nausea vomiting. Reports feeling tired    OBJECTIVE:    /68   Pulse 86   Temp 97.7 °F (36.5 °C) (Oral)   Resp 18   Ht 5' 7\" (1.702 m)   Wt 230 lb (104.3 kg)   SpO2 97%   BMI 36.02 kg/m²     General: NAD/ male  HEENT: Anicteric sclera/moist oral mucosa  Neck: Supple/trachea is midline  Chest:ymmetric excursion/nonlabored respirations  Cor: Regular  Abd.: Soft and obese  Extr.:  No significant peripheral edema  Skin: Warm and dry. DATA:     Lab Results   Component Value Date    WBC 6.7 02/12/2022    RBC 3.63 02/12/2022    HGB 7.0 02/12/2022    HCT 26.4 02/12/2022    MCV 72.7 02/12/2022    MCH 19.3 02/12/2022    MCHC 26.5 02/12/2022    RDW 21.6 02/12/2022     02/12/2022    MPV 10.4 02/12/2022     Lab Results   Component Value Date     02/12/2022    K 4.0 02/12/2022    K 4.0 02/10/2022     02/12/2022    CO2 22 02/12/2022    BUN 11 02/12/2022    CREATININE 0.8 02/12/2022    CALCIUM 8.7 02/12/2022    PROT 7.7 02/12/2022    LABALBU 3.8 02/12/2022    BILITOT 0.8 02/12/2022    ALKPHOS 76 02/12/2022    AST 46 02/12/2022    ALT 17 02/12/2022     Lab Results   Component Value Date    LIPASE 20 02/10/2022     No results found for: AMYLASE      ASSESSMENT/PLAN:    1. Microcytic anemia/GI bleed/hematochezia    - EGD 2/11 revealing grade 1 esophageal varices without high risk stigmata  -Decreased H&H without overt bleed  -Patient will require colonoscopy -see discussion below     2.   Cirrhosis Abnormal CT scan  -Patient has history of recurrent acute pancreatitis  -Currently without abdominal pain, lipase not elevated  -Check tumor markers considering presence of pancreatic mass  -Mass is smaller in size, most likely sequelae of prior pancreatitis  -Consider referral to advanced endoscopy for outpatient EUS    3. Cirrhosis  -Grade 1 varices   -Minimal coagulopathy  -No significant thrombocytopenia  -EGD as above  -Obtain AFP    Above has been discussed in detail with the patient. Explained trend of hemoglobin may be secondary to equilibration however gastrointestinal source of blood loss cannot be fully excluded at this time and patient will require colonoscopy. Patient verbalized understanding and states that he would like to proceed with inpatient endoscopy. Patient be considered for colonoscopy Monday after prep. Urmila Mccoy MD  2/12/2022  1:38 PM    NOTE:  This report was transcribed using voice recognition software. Every effort was made to ensure accuracy; however, inadvertent computerized transcription errors may be present.

## 2022-02-12 NOTE — OP NOTE
EGD Operative Note      Patient: Krystal Lester  YOB: 1975  MRN: [id number]    Date of Procedure: 2/11/2022    Pre-Op Diagnosis: ANEMIA    Post-Op Diagnosis: Grade I esophageal varices, mild antral gastritis       Procedure(s):  EGD ESOPHAGOGASTRODUODENOSCOPY    Surgeon(s):  Vahid San DO    Assistant:   Surgical Assistant: Huey Harrison RN  Fellow: Tyson Gray DO    Anesthesia: Monitor Anesthesia Care    Estimated Blood Loss (mL): None    Complications: None    Specimens:   * No specimens in log *    Implants:  * No implants in log *      Procedure:  Esophagogastroduodenoscopy    Consent:   Informed consent was obtained from the patient including and not limited to risk of perforation, aspiration of gastric contents or teeth, bleeding, infection, dental breakage, ileus, need for surgery, or worst case death. Sedation:  MAC    Endoscope was advanced easily through mouth to second portion of duodenum    Oropharynx views are limited but grossly normal.    Esophagus:  Mucosa is normal.  GEJ at 36 cm. Three columns of small varices that flatten with insufflation, without any red-fuentes marks or other high risk stigmata or stigmata of recent bleeding    Stomach:   Antrum with mild gastritis  Gastric body is normal.  Retroflexed views show normal fundus and cardia. Duodenum:  Bulb is normal.  Second portion of duodenum is normal.    No fresh or old blood seen    IMPRESSION AND PLAN:     1.  Grade I esophageal varices, without high-risk stigmata and not amenable to banding    2. Mild antral gastritis    Recommendations: No fresh or old blood seen on EGD. No source of anemia identified on EGD. Discontinue octreotide and protonix infusions. Start protonix 40mg PO daily. Okay for general diet from GI POV.  Continue to trend H&H tomorrow, if patient has recurrent/persistent bleeding or further drop in H&H, consider inpatient colonoscopy, otherwise can plan for outpatient colonoscopy. Follow up as outpatient in office, call [phone] to schedule for appointment. Pt was seen and procedure was performed with Dr. Madelaine Pickens present for the entire procedure        Electronically signed by Renetta Koyanagi, DO on 2/11/2022 at 7:35 PM       I was present for entire duration of procedure and participated in key and non-key portions. Discussed findings with the fellow and agree with recommendations above.     Tara Garcia DO  2/11/2022  7:42 PM

## 2022-02-12 NOTE — PLAN OF CARE
Problem: Safety:  Goal: Ability to remain free from injury will improve  Description: Ability to remain free from injury will improve  Outcome: Met This Shift     Problem: Bleeding:  Goal: Will show no signs and symptoms of excessive bleeding  Description: Will show no signs and symptoms of excessive bleeding  Outcome: Met This Shift     Problem: Sensory:  Goal: Ability to demonstrate ways to enhance comfort will improve  Description: Ability to demonstrate ways to enhance comfort will improve  Outcome: Met This Shift

## 2022-02-12 NOTE — PLAN OF CARE
Problem: Safety:  Goal: Ability to remain free from injury will improve  Description: Ability to remain free from injury will improve  2/12/2022 1356 by Elliot Mills  Outcome: Met This Shift     Problem: Bleeding:  Goal: Will show no signs and symptoms of excessive bleeding  Description: Will show no signs and symptoms of excessive bleeding  2/12/2022 1356 by Elliot Mills  Outcome: Met This Shift     Problem: Sensory:  Goal: Ability to demonstrate ways to enhance comfort will improve  Description: Ability to demonstrate ways to enhance comfort will improve  2/12/2022 1356 by Elliot Mills  Outcome: Met This Shift

## 2022-02-12 NOTE — DISCHARGE SUMMARY
Internal Medicine Progress Note     [facility]     Trevin Liang. Kemal Child., F.A.CElliottOElliottI. Dwaine Vargas D.O., BRITTANYODAYANARA Alicia D.O. Eva San, MSN, APRN, NP-C  Mik Duggan. Harleen Malik, MSN, APRN-CNP       Internal Medicine  Discharge Summary    NAME: Marilee Ramos  :  1975  MRN:  [id number]  PCP:Hayden Echeverria DO  ADMITTED: 2/10/2022      DISCHARGED: 22    ADMITTING PHYSICIAN: Trevin Ochoa DO    CONSULTANT(S):   IP CONSULT TO SOCIAL WORK  IP CONSULT TO GI  IP CONSULT TO GENERAL SURGERY     ADMITTING DIAGNOSIS:   GI bleed [K92.2]  Gastrointestinal hemorrhage, unspecified gastrointestinal hemorrhage type [K92.2]  Anemia, unspecified type [D64.9]     DISCHARGE DIAGNOSES:   · Acute anemia secondary to GI bleed s/p EGD and Colonoscopy  · History of squamous cell cancer of tongue  · Vitamin Q97 and folic acid deficiency  · Hypertension  · History of pancreatitis  · Current tobacco abuse  · Alcohol dependence  · B12 and folate deficiency      BRIEF HISTORY OF PRESENT ILLNESS:   Patient is a 48-year-old male who presented to the ED due to increased weakness, fatigue and shortness of breath. Patient states her symptoms have been progressive over the last few months. States she has a history of hemorrhoids. He has noticed significant bleeding at times but not consistently/daily. He denies any melena. He denies any abdominal pain. He does admit to sore throat. He does admit. Patient of throat with oral intake. He does admit to increased phlegm production. States he had a colonoscopy about 5 years ago. States he had benign polyps at that time.     LABS[de-identified]  Lab Results   Component Value Date    WBC 13.8 (H) 2022    HGB 8.3 (L) 2022    HCT 30.4 (L) 2022     2022     2022    K 3.3 (L) 2022     2022    CREATININE 0.8 2022    BUN 15 2022    CO2 22 2022    GLUCOSE 125 (H) 02/13/2022    ALT 25 02/13/2022    AST 65 (H) 02/13/2022    INR 1.2 02/10/2022     Lab Results   Component Value Date    INR 1.2 02/10/2022    PROTIME 13.9 (H) 02/10/2022      Lab Results   Component Value Date    TSH 1.210 02/11/2022     No results found for: TRIG  No results found for: HDL  No results found for: [address]  Lab Results   Component Value Date    LABA1C 5.8 (H) 02/12/2022       IMAGING:  CT ABDOMEN PELVIS W IV CONTRAST Additional Contrast? None    Result Date: 2/10/2022  EXAMINATION: CT OF THE ABDOMEN AND PELVIS WITH CONTRAST 2/10/2022 5:31 pm TECHNIQUE: CT of the abdomen and pelvis was performed with the administration of intravenous contrast. Multiplanar reformatted images are provided for review. Dose modulation, iterative reconstruction, and/or weight based adjustment of the mA/kV was utilized to reduce the radiation dose to as low as reasonably achievable. COMPARISON: The previous study performed 06/08/2020. HISTORY: ORDERING SYSTEM PROVIDED HISTORY: anemia, cirrhosis? epigastric pain, pancreatitis TECHNOLOGIST PROVIDED HISTORY: Reason for exam:->anemia, cirrhosis? epigastric pain, pancreatitis Additional Contrast?->None Decision Support Exception - unselect if not a suspected or confirmed emergency medical condition->Emergency Medical Condition (MA) FINDINGS: Lower Chest: The lung bases are clear. Organs: There is again hepatomegaly. The liver exhibits a heterogeneous density with alternating areas of decreased attenuation and normal density parenchyma. Rule out fatty infiltration versus hepatocellular disease. There is a fine nodularity to the liver contour. These findings are all indicate cirrhosis. The spleen, biliary tree, and adrenal glands are unremarkable. The gallbladder is unremarkable in appearance, however, small amount of pericholecystic fluid is again identified.  There is again noted to be induration and linear stranding in the fat planes about the pancreatic head, with mild extension towards the pancreatic body, shaheed hepatis, and left anterior pararenal space. The inflammatory process is overall slightly less prominent than seen previously. Regardless, the findings indicate acute pancreatitis. There is no evidence of pancreatic necrosis or gross pancreatic duct obstruction. There has been a mild decrease in size of a rounded hypodensity in the pancreatic tail. It measures 0.8 cm. It previously measured 1.3 cm. The pericholecystic fluid is probably secondary to the nearby pancreatitis. Left renal cortical cysts are again identified. Bilateral nephrolithiasis is again noted. GI/Bowel: The stomach is suboptimally distended, but grossly unremarkable. The small and large bowel are normal in appearance for a non oral contrast study. The appendix is visualized and normal in appearance. Pelvis: The urinary bladder is adequately distended and unremarkable. The seminal vesicles are symmetric in size. The prostate gland is unremarkable. There is a 0.2 cm calculus within the distal left ureter, by the ureterovesical junction. There is mild dilatation of the mid to distal left ureter, however, there is no evidence of hydronephrosis. This mild mid to distal left ureteral dilatation can be seen on the prior study and no calculus was identified within the distal ureter at that time. This represents a nonobstructing calculus. Peritoneum/Retroperitoneum: No retroperitoneal or pelvic lymphadenopathy is appreciated. No free fluid is identified in the pelvis. No abdominal or pelvic soft tissue mass is noted. The abdominal aorta is again mildly atherosclerotic. Bones/Soft Tissues: The visualized osseous structures are unremarkable for the patient's age. 1.  Acute pancreatitis.  2.  Mild decrease in size of a rounded hypodensity in the pancreatic tail, when compared to the previous study performed 06/08/2020. 3.  Small amount of pericholecystic fluid, probably secondary to the nearby pancreatitis. 4.  Hepatomegaly again seen. Findings consistent with liver cirrhosis. 5.  Bilateral nephrolithiasis again seen. 6.  Nonobstructing 0.2 cm calculus in the distal left ureter, by the UVJ. 7.  Left renal cortical cysts again identified. HOSPITAL COURSE:   Rebecca Matos did rather well throughout hospitalization. He was initially dated February 10 due to symptomatic anemia of a presumed GI etiology. The GI and surgery team provided consultation and the patient underwent EGD which revealed esophageal varices without high risk stigmata of bleeding. Mild antral gastritis was also identified. The varices did not require banding. He had very mild downtrending hemoglobin secondary to dilution from IV fluids and phlebotomy but did not have any bowel movements or overt clinical bleeding. He received an additional unit of blood as his hemoglobin was borderline to avoid the development of symptoms was become more active. Blood counts remained stable. He was insistent on obtaining inpatient colonoscopy as he did not want to arrange this as an outpatient himself. Colonoscopy showed internal hemorrhoids but otherwise was unremarkable and retroflexion in the rectum showed a minimal amount of fresh blood suggesting the recent bleeding was due to internal hemorrhoids. His chronic morbidities well managed. Mild hyperglycemia was identified following bolus of Solu-Medrol and hemoglobin A1c was assessed and was impaired fasting glucose range at 5.8%. We will defer the institution of any medications for impaired fasting glucose if indeed present to the primary care provider. He is advised importance of alcohol cessation, close GI and primary care follow-up and voices understanding and agreement.      BRIEF PHYSICAL EXAMINATION AND LABORATORIES ON DAY OF DISCHARGE:  VITALS:  /86   Pulse 88   Temp 98.1 °F (36.7 °C) (Oral)   Resp 18   Ht 5' 7\" (1.702 m)   Wt 230 lb (104.3 kg)   SpO2 97%   BMI 36.02 kg/m²     HEENT:  THERESE. EOMI. Sclera clear. Buccal mucosa moist.    Neck:  Supple. Trachea midline. No thyromegaly. No JVD. No bruits. Heart:  Rhythm regular, rate controlled. S1-S2. Lungs:  Symmetrical. Clear to auscultation bilaterally. No wheezes. No rhonchi. No rales. Abdomen: Soft. Obese. Non-tender. Non-distended. Bowel sounds positive. No organomegaly or masses. No pain on palpation    Extremities:  Peripheral pulses present. No peripheral edema. No ulcers. Neurologic:  Alert x 3. No focal deficit. Cranial nerves grossly intact. Skin:  No petechia. No hemorrhage. No wounds. DISPOSITION:  The patient's condition is good. At this time the patient is without objective evidence of an acute process requiring continuing hospitalization or inpatient management. They are stable for discharge with outpatient follow-up. I have spoken with the patient and discussed the results of the current hospitalization, in addition to providing specific details for the plan of care and counseling regarding the diagnosis and prognosis. The plan has been discussed in detail and they are aware of the specific conditions for emergent return, as well as the importance of follow-up.   Their questions are answered at this time and they are agreeable with the plan for discharge to home    DISCHARGE MEDICATIONS:   Current Discharge Medication List           Details   pantoprazole (PROTONIX) 40 MG tablet Take 1 tablet by mouth every morning (before breakfast)  Qty: 30 tablet, Refills: 0      thiamine mononitrate (THIAMINE) 100 MG tablet Take 1 tablet by mouth daily  Qty: 30 tablet, Refills: 0      nystatin (MYCOSTATIN) 971361 UNIT/ML suspension Take 5 mLs by mouth 4 times daily for 14 days  Qty: 280 mL, Refills: 0      polyethylene glycol (GLYCOLAX) 17 g packet Take 17 g by mouth daily as needed for Constipation  Qty: 527 g, Refills: 1      folic acid (FOLVITE) 1 MG tablet Take 1 tablet by mouth daily  Qty: 30 tablet, Refills: 0              Details   losartan (COZAAR) 25 MG tablet Take 25 mg by mouth daily      clobetasol (TEMOVATE) 0.05 % cream Apply 1 each topically 2 times daily Apply topically 2 times daily. oxymetazoline (AFRIN) 0.05 % nasal spray 2 sprays by Nasal route 2 times daily      amLODIPine (NORVASC) 5 MG tablet Take 5 mg by mouth daily             FOLLOW UP/INSTRUCTIONS:  · This patient is instructed to follow-up with his primary care physician. · Patient is instructed to follow-up with the consults listed above as directed by them. · he is instructed to resume home medications and take new medications as indicated in the list above. · If the patient has a recurrence of symptoms, he is instructed to go to the ED. Preparing for this patient's discharge, including paperwork, orders, instructions, and meeting with patient did require > 40 minutes.     MARY Colon CNP     2/13/2022  11:17 AM

## 2022-02-12 NOTE — ANESTHESIA POSTPROCEDURE EVALUATION
Department of Anesthesiology  Postprocedure Note    Patient: Elizabeth Gonzalez  MRN: [id number]  YOB: 1975  Date of evaluation: 2/11/2022  Time:  9:29 PM     Procedure Summary     Date: 02/11/22 Room / Location: [facility] 01 / [address] [facility]    Anesthesia Start: Barks[place] Jules Anesthesia Stop: 1950    Procedure: EGD ESOPHAGOGASTRODUODENOSCOPY (N/A ) Diagnosis: (ANEMIA)    Surgeons: Faith Oquendo DO Responsible Provider: Carroll Art DO    Anesthesia Type: MAC ASA Status: 3          Anesthesia Type: MAC    Pamela Phase I:      Pamela Phase II: Pamela Score: 10    Last vitals: Reviewed and per EMR flowsheets.        Anesthesia Post Evaluation    Patient location during evaluation: PACU  Patient participation: complete - patient participated  Level of consciousness: awake and alert  Airway patency: patent  Nausea & Vomiting: no nausea and no vomiting  Complications: no  Cardiovascular status: hemodynamically stable  Respiratory status: acceptable  Hydration status: euvolemic

## 2022-02-12 NOTE — PLAN OF CARE
Problem: Safety:  Goal: Ability to remain free from injury will improve  Description: Ability to remain free from injury will improve  2/12/2022 1845 by Xochilt Freeman RN  Outcome: Met This Shift

## 2022-02-13 ENCOUNTER — ANESTHESIA EVENT (OUTPATIENT)
Dept: ENDOSCOPY | Age: 47
DRG: 378 | End: 2022-02-13
Payer: COMMERCIAL

## 2022-02-13 ENCOUNTER — ANESTHESIA (OUTPATIENT)
Dept: ENDOSCOPY | Age: 47
DRG: 378 | End: 2022-02-13
Payer: COMMERCIAL

## 2022-02-13 VITALS
OXYGEN SATURATION: 99 % | SYSTOLIC BLOOD PRESSURE: 124 MMHG | DIASTOLIC BLOOD PRESSURE: 77 MMHG | RESPIRATION RATE: 11 BRPM

## 2022-02-13 VITALS
DIASTOLIC BLOOD PRESSURE: 90 MMHG | RESPIRATION RATE: 10 BRPM | HEIGHT: 67 IN | BODY MASS INDEX: 36.1 KG/M2 | OXYGEN SATURATION: 97 % | HEART RATE: 82 BPM | SYSTOLIC BLOOD PRESSURE: 140 MMHG | TEMPERATURE: 97.6 F | WEIGHT: 230 LBS

## 2022-02-13 LAB
ALBUMIN SERPL-MCNC: 3.9 G/DL (ref 3.5–5.2)
ALP BLD-CCNC: 80 U/L (ref 40–129)
ALT SERPL-CCNC: 25 U/L (ref 0–40)
ANION GAP SERPL CALCULATED.3IONS-SCNC: 13 MMOL/L (ref 7–16)
ANISOCYTOSIS: ABNORMAL
AST SERPL-CCNC: 65 U/L (ref 0–39)
BASOPHILS ABSOLUTE: 0 E9/L (ref 0–0.2)
BASOPHILS RELATIVE PERCENT: 0.3 % (ref 0–2)
BILIRUB SERPL-MCNC: 1.1 MG/DL (ref 0–1.2)
BUN BLDV-MCNC: 15 MG/DL (ref 6–20)
BURR CELLS: ABNORMAL
CALCIUM SERPL-MCNC: 8.9 MG/DL (ref 8.6–10.2)
CHLORIDE BLD-SCNC: 103 MMOL/L (ref 98–107)
CO2: 22 MMOL/L (ref 22–29)
CREAT SERPL-MCNC: 0.8 MG/DL (ref 0.7–1.2)
EOSINOPHILS ABSOLUTE: 0.12 E9/L (ref 0.05–0.5)
EOSINOPHILS RELATIVE PERCENT: 0.9 % (ref 0–6)
GFR AFRICAN AMERICAN: >60
GFR NON-AFRICAN AMERICAN: >60 ML/MIN/1.73
GLUCOSE BLD-MCNC: 125 MG/DL (ref 74–99)
HCT VFR BLD CALC: 30.4 % (ref 37–54)
HCT VFR BLD CALC: 32.4 % (ref 37–54)
HEMOGLOBIN: 8.3 G/DL (ref 12.5–16.5)
HEMOGLOBIN: 8.9 G/DL (ref 12.5–16.5)
HYPOCHROMIA: ABNORMAL
LYMPHOCYTES ABSOLUTE: 1.38 E9/L (ref 1.5–4)
LYMPHOCYTES RELATIVE PERCENT: 10.3 % (ref 20–42)
MCH RBC QN AUTO: 20.4 PG (ref 26–35)
MCHC RBC AUTO-ENTMCNC: 27.3 % (ref 32–34.5)
MCV RBC AUTO: 74.9 FL (ref 80–99.9)
MONOCYTES ABSOLUTE: 0.69 E9/L (ref 0.1–0.95)
MONOCYTES RELATIVE PERCENT: 5.1 % (ref 2–12)
NEUTROPHILS ABSOLUTE: 11.59 E9/L (ref 1.8–7.3)
NEUTROPHILS RELATIVE PERCENT: 83.8 % (ref 43–80)
OVALOCYTES: ABNORMAL
PDW BLD-RTO: 22.6 FL (ref 11.5–15)
PLATELET # BLD: 182 E9/L (ref 130–450)
PMV BLD AUTO: 10.2 FL (ref 7–12)
POIKILOCYTES: ABNORMAL
POLYCHROMASIA: ABNORMAL
POTASSIUM SERPL-SCNC: 3.3 MMOL/L (ref 3.5–5)
RBC # BLD: 4.06 E12/L (ref 3.8–5.8)
SODIUM BLD-SCNC: 138 MMOL/L (ref 132–146)
TEAR DROP CELLS: ABNORMAL
TOTAL PROTEIN: 7.8 G/DL (ref 6.4–8.3)
WBC # BLD: 13.8 E9/L (ref 4.5–11.5)

## 2022-02-13 PROCEDURE — 2580000003 HC RX 258: Performed by: INTERNAL MEDICINE

## 2022-02-13 PROCEDURE — 2580000003 HC RX 258: Performed by: NURSE ANESTHETIST, CERTIFIED REGISTERED

## 2022-02-13 PROCEDURE — 82105 ALPHA-FETOPROTEIN SERUM: CPT

## 2022-02-13 PROCEDURE — 6370000000 HC RX 637 (ALT 250 FOR IP): Performed by: INTERNAL MEDICINE

## 2022-02-13 PROCEDURE — 7100000011 HC PHASE II RECOVERY - ADDTL 15 MIN: Performed by: INTERNAL MEDICINE

## 2022-02-13 PROCEDURE — 7100000010 HC PHASE II RECOVERY - FIRST 15 MIN: Performed by: INTERNAL MEDICINE

## 2022-02-13 PROCEDURE — 6360000002 HC RX W HCPCS: Performed by: NURSE ANESTHETIST, CERTIFIED REGISTERED

## 2022-02-13 PROCEDURE — 6360000002 HC RX W HCPCS: Performed by: INTERNAL MEDICINE

## 2022-02-13 PROCEDURE — 3700000000 HC ANESTHESIA ATTENDED CARE: Performed by: INTERNAL MEDICINE

## 2022-02-13 PROCEDURE — 85014 HEMATOCRIT: CPT

## 2022-02-13 PROCEDURE — 0DJD8ZZ INSPECTION OF LOWER INTESTINAL TRACT, VIA NATURAL OR ARTIFICIAL OPENING ENDOSCOPIC: ICD-10-PCS | Performed by: INTERNAL MEDICINE

## 2022-02-13 PROCEDURE — 3700000001 HC ADD 15 MINUTES (ANESTHESIA): Performed by: INTERNAL MEDICINE

## 2022-02-13 PROCEDURE — 3609027000 HC COLONOSCOPY: Performed by: INTERNAL MEDICINE

## 2022-02-13 PROCEDURE — 6370000000 HC RX 637 (ALT 250 FOR IP): Performed by: NURSE PRACTITIONER

## 2022-02-13 PROCEDURE — 36415 COLL VENOUS BLD VENIPUNCTURE: CPT

## 2022-02-13 PROCEDURE — 2709999900 HC NON-CHARGEABLE SUPPLY: Performed by: INTERNAL MEDICINE

## 2022-02-13 PROCEDURE — 85025 COMPLETE CBC W/AUTO DIFF WBC: CPT

## 2022-02-13 PROCEDURE — 80053 COMPREHEN METABOLIC PANEL: CPT

## 2022-02-13 PROCEDURE — 85018 HEMOGLOBIN: CPT

## 2022-02-13 RX ORDER — FOLIC ACID 1 MG/1
1 TABLET ORAL DAILY
Qty: 30 TABLET | Refills: 0 | Status: SHIPPED | OUTPATIENT
Start: 2022-02-13

## 2022-02-13 RX ORDER — POLYETHYLENE GLYCOL 3350 17 G/17G
17 POWDER, FOR SOLUTION ORAL DAILY PRN
Qty: 527 G | Refills: 1 | Status: SHIPPED | OUTPATIENT
Start: 2022-02-13 | End: 2022-03-15

## 2022-02-13 RX ORDER — POTASSIUM CHLORIDE 20 MEQ/1
40 TABLET, EXTENDED RELEASE ORAL PRN
Status: DISCONTINUED | OUTPATIENT
Start: 2022-02-13 | End: 2022-02-13 | Stop reason: HOSPADM

## 2022-02-13 RX ORDER — THIAMINE MONONITRATE (VIT B1) 100 MG
100 TABLET ORAL DAILY
Qty: 30 TABLET | Refills: 0 | Status: SHIPPED | OUTPATIENT
Start: 2022-02-13

## 2022-02-13 RX ORDER — PROPOFOL 10 MG/ML
INJECTION, EMULSION INTRAVENOUS PRN
Status: DISCONTINUED | OUTPATIENT
Start: 2022-02-13 | End: 2022-02-13 | Stop reason: SDUPTHER

## 2022-02-13 RX ORDER — POTASSIUM CHLORIDE 7.45 MG/ML
10 INJECTION INTRAVENOUS PRN
Status: DISCONTINUED | OUTPATIENT
Start: 2022-02-13 | End: 2022-02-13 | Stop reason: HOSPADM

## 2022-02-13 RX ORDER — PANTOPRAZOLE SODIUM 40 MG/1
40 TABLET, DELAYED RELEASE ORAL
Qty: 30 TABLET | Refills: 0 | Status: SHIPPED | OUTPATIENT
Start: 2022-02-13

## 2022-02-13 RX ORDER — SODIUM CHLORIDE 9 MG/ML
INJECTION, SOLUTION INTRAVENOUS CONTINUOUS PRN
Status: DISCONTINUED | OUTPATIENT
Start: 2022-02-13 | End: 2022-02-13 | Stop reason: SDUPTHER

## 2022-02-13 RX ADMIN — NYSTATIN 500000 UNITS: 100000 SUSPENSION ORAL at 07:32

## 2022-02-13 RX ADMIN — PANTOPRAZOLE SODIUM 40 MG: 40 TABLET, DELAYED RELEASE ORAL at 05:17

## 2022-02-13 RX ADMIN — GABAPENTIN 100 MG: 100 CAPSULE ORAL at 07:32

## 2022-02-13 RX ADMIN — SODIUM CHLORIDE 125 MG: 9 INJECTION, SOLUTION INTRAVENOUS at 08:53

## 2022-02-13 RX ADMIN — FOLIC ACID 1 MG: 1 TABLET ORAL at 07:32

## 2022-02-13 RX ADMIN — THIAMINE HCL TAB 100 MG 100 MG: 100 TAB at 07:32

## 2022-02-13 RX ADMIN — POTASSIUM CHLORIDE 40 MEQ: 1500 TABLET, EXTENDED RELEASE ORAL at 08:57

## 2022-02-13 RX ADMIN — PROPOFOL 300 MG: 10 INJECTION, EMULSION INTRAVENOUS at 10:48

## 2022-02-13 RX ADMIN — Medication 10 ML: at 07:33

## 2022-02-13 RX ADMIN — SODIUM CHLORIDE: 9 INJECTION, SOLUTION INTRAVENOUS at 10:45

## 2022-02-13 RX ADMIN — CYANOCOBALAMIN 1000 MCG: 1000 INJECTION, SOLUTION INTRAMUSCULAR; SUBCUTANEOUS at 08:57

## 2022-02-13 ASSESSMENT — PAIN SCALES - GENERAL
PAINLEVEL_OUTOF10: 0

## 2022-02-13 ASSESSMENT — LIFESTYLE VARIABLES: SMOKING_STATUS: 1

## 2022-02-13 NOTE — OP NOTE
Operative Note      Patient: Marilee Ramos  YOB: 1975  MRN: [id number]    Date of Procedure: 2/13/2022    Pre-Op Diagnosis: GI BLEED    Post-Op Diagnosis: normal colonoscopy; internal hemorrhoids       Procedure(s):  COLONOSCOPY DIAGNOSTIC    Surgeon(s):  Pat Mejia MD    Assistant:   Surgical Assistant: Alex Wallace RN    Anesthesia: Monitor Anesthesia Care    Estimated Blood Loss (mL): none    Complications: None    Specimens:   * No specimens in log *    Implants:  * No implants in log *      Drains: * No LDAs found *    Findings: internal hemorrhoids    Detailed Description of Procedure:   Colonoscopy note    Indication gi bleeding      Sedation  MAC    Endoscope was advanced through anus to cecum and terminal lileum      Preparation is good. Patient tolerated procedure well. Terminal ileum is normal  Cecum is normal  Ascending colon is normal  Transverse colon is normal  Descending colon is rosa  Sigmoid colon have few small widely scattered diverticula. No fresh or old blood in TI or colon. Rectum direct views are normal.  Retroflexion in rectum shows minimal amount of fresh blood; internal hemorrhoids present, moderate; non protruding, I do not see actively bleeding area (see images)    IMPRESSION AND PLAN: Normal colonoscopy with minimal diverticulosis. Internal hemorrhoids. Resume diet.         Electronically signed by Pat Mejia MD on 2/13/2022 at 11:01 AM

## 2022-02-13 NOTE — ANESTHESIA POSTPROCEDURE EVALUATION
Department of Anesthesiology  Postprocedure Note    Patient: Luh Flanagan  MRN: [id number]  YOB: 1975  Date of evaluation: 2/13/2022  Time:  11:51 AM     Procedure Summary     Date: 02/13/22 Room / Location: [facility] 01 / [address] [facility]    Anesthesia Start: 2885 Anesthesia Stop: 3655    Procedure: COLONOSCOPY DIAGNOSTIC (N/A ) Diagnosis: (GI BLEED)    Surgeons: Bassem Guadarrama MD Responsible Provider: Zaid Logan DO    Anesthesia Type: MAC ASA Status: 3          Anesthesia Type: MAC    Pamela Phase I: Pamela Score: 10    Pamela Phase II: Pamela Score: 10    Last vitals: Reviewed and per EMR flowsheets.        Anesthesia Post Evaluation    Patient location during evaluation: PACU  Patient participation: complete - patient participated  Level of consciousness: awake and alert  Airway patency: patent  Nausea & Vomiting: no nausea and no vomiting  Complications: no  Cardiovascular status: hemodynamically stable  Respiratory status: acceptable  Hydration status: euvolemic

## 2022-02-13 NOTE — PROGRESS NOTES
Pt. states he had 3rd bowel movement with medium amount red bloody stool at 3am today. Report has been given to the morning RN. No complaints of pain at the moment. Pt. is waiting for colonoscopy this morning. Pt. will continued to be monitored and assessed.

## 2022-02-13 NOTE — PLAN OF CARE
Problem: Safety:  Goal: Ability to remain free from injury will improve  Description: Ability to remain free from injury will improve  2/12/2022 2225 by Desire Alex Madrid RN  Outcome: Met This Shift     Problem: Bleeding:  Goal: Will show no signs and symptoms of excessive bleeding  Description: Will show no signs and symptoms of excessive bleeding  2/12/2022 2225 by Ambar Madrid RN  Outcome: Met This Shift     Problem: Sensory:  Goal: Ability to demonstrate ways to enhance comfort will improve  Description: Ability to demonstrate ways to enhance comfort will improve  2/12/2022 2225 by Desire Alex Madrid RN  Outcome: Met This Shift

## 2022-02-13 NOTE — ANESTHESIA PRE PROCEDURE
Department of Anesthesiology  Preprocedure Note       Name:  Yelena Faith   Age:  51 y.o.  :  1975                                          MRN:  [id number]         Date:  2022      Surgeon: Ndiia Luna):  Megan Cam MD    Procedure: Procedure(s):  COLONOSCOPY DIAGNOSTIC    Medications prior to admission:   Prior to Admission medications    Medication Sig Start Date End Date Taking? Authorizing Provider   pantoprazole (PROTONIX) 40 MG tablet Take 1 tablet by mouth every morning (before breakfast) 22   MARY Bragg - MARCUS   thiamine mononitrate (THIAMINE) 100 MG tablet Take 1 tablet by mouth daily 22   MARY Bragg - CNP   nystatin (MYCOSTATIN) 607075 UNIT/ML suspension Take 5 mLs by mouth 4 times daily for 14 days 22  MARY Bragg CNP   polyethylene glycol (GLYCOLAX) 17 g packet Take 17 g by mouth daily as needed for Constipation 2/12/22 3/14/22  MARY Bragg CNP   folic acid (FOLVITE) 1 MG tablet Take 1 tablet by mouth daily 22   MARY Bragg CNP   losartan (COZAAR) 25 MG tablet Take 25 mg by mouth daily    Historical Provider, MD   clobetasol (TEMOVATE) 0.05 % cream Apply 1 each topically 2 times daily Apply topically 2 times daily. Historical Provider, MD   oxymetazoline (AFRIN) 0.05 % nasal spray 2 sprays by Nasal route 2 times daily    Historical Provider, MD   amLODIPine (NORVASC) 5 MG tablet Take 5 mg by mouth daily    Historical Provider, MD       Current medications:    No current facility-administered medications for this visit.      Current Outpatient Medications   Medication Sig Dispense Refill    pantoprazole (PROTONIX) 40 MG tablet Take 1 tablet by mouth every morning (before breakfast) 30 tablet 0    thiamine mononitrate (THIAMINE) 100 MG tablet Take 1 tablet by mouth daily 30 tablet 0    nystatin (MYCOSTATIN) 714257 UNIT/ML suspension Take 5 mLs by mouth 4 times daily for 14 days 280 mL 0    polyethylene glycol (GLYCOLAX) 17 g packet Take 17 g by mouth daily as needed for Constipation 030 g 1    folic acid (FOLVITE) 1 MG tablet Take 1 tablet by mouth daily 30 tablet 0     Facility-Administered Medications Ordered in Other Visits   Medication Dose Route Frequency Provider Last Rate Last Admin    potassium chloride (KLOR-CON M) extended release tablet 40 mEq  40 mEq Oral PRN Alexander Templeton, DO   40 mEq at 02/13/22 0857    Or    potassium bicarb-citric acid (EFFER-K) effervescent tablet 40 mEq  40 mEq Oral PRN Alexander Templeton, DO        Or    potassium chloride 10 mEq/100 mL IVPB (Peripheral Line)  10 mEq IntraVENous PRN Alexander Templeton, DO        0.9 % sodium chloride infusion   IntraVENous PRN Ryan Teofilo, APRN - CNP        pantoprazole (PROTONIX) tablet 40 mg  40 mg Oral QAM AC Ryan Teofilo, APRN - CNP   40 mg at 02/13/22 0517    ondansetron (ZOFRAN) injection 4 mg  4 mg IntraVENous Once Luh Isbell MD        Or    ondansetron (ZOFRAN-ODT) disintegrating tablet 4 mg  4 mg Oral Once Luh Isbell MD        LORazepam (ATIVAN) injection 1 mg  1 mg IntraVENous Q4H PRN Nancy Finely, DO   1 mg at 02/12/22 2353    Or    LORazepam (ATIVAN) tablet 1 mg  1 mg Oral Q4H PRN Nancy Finely, DO   1 mg at 02/36/65 5136    folic acid (FOLVITE) tablet 1 mg  1 mg Oral Daily Nancy Finely, DO   1 mg at 02/13/22 0732    gabapentin (NEURONTIN) capsule 100 mg  100 mg Oral TID Horacio Osler Bisel, DO   100 mg at 02/13/22 0732    thiamine mononitrate tablet 100 mg  100 mg Oral Daily Alcides Ochoa, DO   100 mg at 02/13/22 0732    ferric gluconate (FERRLECIT) 125 mg in sodium chloride 0.9 % 100 mL IVPB  125 mg IntraVENous Daily Horacio Osler Bisel,  mL/hr at 02/13/22 0853 125 mg at 02/13/22 0853    cyanocobalamin injection 1,000 mcg  1,000 mcg IntraMUSCular Daily Alcides Ochoa, DO   1,000 mcg at 02/13/22 0857    0.9 % sodium chloride infusion   IntraVENous PRN Nancy Finely, DO        glucose (GLUTOSE) 40 % oral gel 15 g  15 g Oral PRN Enedelia Music, DO        glucagon (rDNA) injection 1 mg  1 mg IntraMUSCular PRN Enedelia Music, DO        dextrose 5 % solution  100 mL/hr IntraVENous PRN Enedelia Music, DO        sodium chloride flush 0.9 % injection 5-40 mL  5-40 mL IntraVENous 2 times per day Enedelia Music, DO   10 mL at 02/13/22 8384    sodium chloride flush 0.9 % injection 5-40 mL  5-40 mL IntraVENous PRN Enedelia Music, DO        0.9 % sodium chloride infusion  25 mL IntraVENous PRN Enedelia Music, DO        polyethylene glycol (GLYCOLAX) packet 17 g  17 g Oral Daily PRN Enedelia Music, DO        acetaminophen (TYLENOL) tablet 650 mg  650 mg Oral Q6H PRN Enedelia Music, DO        Or    acetaminophen (TYLENOL) suppository 650 mg  650 mg Rectal Q6H PRN Enedelia Music, DO        nystatin (MYCOSTATIN) 048823 UNIT/ML suspension 500,000 Units  5 mL Oral 4x Daily Enedelia Music, DO   500,000 Units at 02/13/22 0732    dextrose bolus (hypoglycemia) 10% 125 mL  125 mL IntraVENous PRN Enedelia Music, DO        Or    dextrose bolus (hypoglycemia) 10% 250 mL  250 mL IntraVENous PRN Enedelia Music, DO           Allergies:  No Known Allergies    Problem List:    Patient Active Problem List   Diagnosis Code    GI bleed K92.2       Past Medical History:        Diagnosis Date    Cancer ([facility]     Oral cancer    Class 2 severe obesity due to excess calories with serious comorbidity and body mass index (BMI) of 36.0 to 36.9 in adult ([facility]     Hypertension     Kidney stone     Pancreatitis     Psoriasis     Renal calculi        Past Surgical History:        Procedure Laterality Date    KIDNEY STONE SURGERY      LYMPHADENECTOMY      Due to oral cancer. Also had part or tounge removed       Social History:    Social History     Tobacco Use    Smoking status: Current Every Day Smoker     Packs/day: 1.00     Types: Cigarettes    Smokeless tobacco: Never Used   Substance Use Topics    Alcohol use:  Yes Alcohol/week: 35.0 standard drinks     Types: 35 Shots of liquor per week     Comment: 4-6 cocktails daily                                Ready to quit: Not Answered  Counseling given: Not Answered      Vital Signs (Current): There were no vitals filed for this visit. BP Readings from Last 3 Encounters:   02/13/22 125/86   02/11/22 122/79   06/08/20 (!) 164/106       NPO Status:  GREATER THAN 8 HOURS                                                                               BMI:   Wt Readings from Last 3 Encounters:   02/10/22 230 lb (104.3 kg)   06/08/20 205 lb (93 kg)   10/23/18 190 lb (86.2 kg)     There is no height or weight on file to calculate BMI.    CBC:   Lab Results   Component Value Date    WBC 13.8 02/13/2022    RBC 4.06 02/13/2022    HGB 8.3 02/13/2022    HCT 30.4 02/13/2022    MCV 74.9 02/13/2022    RDW 22.6 02/13/2022     02/13/2022       CMP:   Lab Results   Component Value Date     02/13/2022    K 3.3 02/13/2022    K 4.0 02/10/2022     02/13/2022    CO2 22 02/13/2022    BUN 15 02/13/2022    CREATININE 0.8 02/13/2022    GFRAA >60 02/13/2022    LABGLOM >60 02/13/2022    GLUCOSE 125 02/13/2022    PROT 7.8 02/13/2022    CALCIUM 8.9 02/13/2022    BILITOT 1.1 02/13/2022    ALKPHOS 80 02/13/2022    AST 65 02/13/2022    ALT 25 02/13/2022       POC Tests: No results for input(s): POCGLU, POCNA, POCK, POCCL, POCBUN, POCHEMO, POCHCT in the last 72 hours.     Coags:   Lab Results   Component Value Date    PROTIME 13.9 02/10/2022    INR 1.2 02/10/2022       HCG (If Applicable): No results found for: PREGTESTUR, PREGSERUM, HCG, HCGQUANT     ABGs: No results found for: PHART, PO2ART, VND9VXW, SJE2CFU, BEART, [id number]     Type & Screen (If Applicable):  No results found for: LABABO, LABRH    Drug/Infectious Status (If Applicable):  No results found for: HIV, HEPCAB    COVID-19 Screening (If Applicable): No results found for: COVID19        Anesthesia Evaluation  Patient summary reviewed no history of anesthetic complications:   Airway: Mallampati: III  TM distance: >3 FB   Neck ROM: full  Mouth opening: > = 3 FB Dental:          Pulmonary: breath sounds clear to auscultation  (+) current smoker                           Cardiovascular:    (+) hypertension:,         Rhythm: regular             Beta Blocker:  Not on Beta Blocker         Neuro/Psych:   (+) psychiatric history:             ROS comment: ALCOHOL ABUSE  GI/Hepatic/Renal:   (+) GERD:,          ROS comment: GI BLEED  HISTORY OF PANCREATITIS . Endo/Other:    (+) malignancy/cancer (ORAL). Abdominal:             Vascular: negative vascular ROS. Other Findings:               Anesthesia Plan      MAC     ASA 3       Induction: intravenous. MIPS: Prophylactic antiemetics administered. Anesthetic plan and risks discussed with patient. Plan discussed with CRNA.             [address]uf Hernandez,    2/13/2022

## 2022-02-14 LAB — CA 19-9: 28 U/ML (ref 0–37)

## 2022-02-15 LAB — AFP-TUMOR MARKER: 2 NG/ML (ref 0–9)

## 2022-09-20 ENCOUNTER — OFFICE VISIT (OUTPATIENT)
Dept: FAMILY MEDICINE CLINIC | Age: 47
End: 2022-09-20
Payer: COMMERCIAL

## 2022-09-20 VITALS
OXYGEN SATURATION: 96 % | HEART RATE: 104 BPM | WEIGHT: 216 LBS | BODY MASS INDEX: 33.9 KG/M2 | SYSTOLIC BLOOD PRESSURE: 128 MMHG | HEIGHT: 67 IN | DIASTOLIC BLOOD PRESSURE: 78 MMHG | RESPIRATION RATE: 17 BRPM | TEMPERATURE: 98.7 F

## 2022-09-20 DIAGNOSIS — H66.003 NON-RECURRENT ACUTE SUPPURATIVE OTITIS MEDIA OF BOTH EARS WITHOUT SPONTANEOUS RUPTURE OF TYMPANIC MEMBRANES: ICD-10-CM

## 2022-09-20 DIAGNOSIS — J40 SINOBRONCHITIS: Primary | ICD-10-CM

## 2022-09-20 DIAGNOSIS — J32.9 SINOBRONCHITIS: Primary | ICD-10-CM

## 2022-09-20 DIAGNOSIS — R05.9 COUGH: ICD-10-CM

## 2022-09-20 DIAGNOSIS — R09.81 SINUS CONGESTION: ICD-10-CM

## 2022-09-20 LAB
Lab: NORMAL
PERFORMING INSTRUMENT: NORMAL
QC PASS/FAIL: NORMAL
SARS-COV-2, POC: NORMAL

## 2022-09-20 PROCEDURE — 99213 OFFICE O/P EST LOW 20 MIN: CPT | Performed by: NURSE PRACTITIONER

## 2022-09-20 PROCEDURE — 87426 SARSCOV CORONAVIRUS AG IA: CPT | Performed by: NURSE PRACTITIONER

## 2022-09-20 RX ORDER — GUAIFENESIN DEXTROMETHORPHAN HYDROBROMIDE ORAL SOLUTION 10; 100 MG/5ML; MG/5ML
10 SOLUTION ORAL EVERY 4 HOURS PRN
Qty: 240 ML | Refills: 0 | Status: SHIPPED | OUTPATIENT
Start: 2022-09-20

## 2022-09-20 RX ORDER — AMOXICILLIN AND CLAVULANATE POTASSIUM 875; 125 MG/1; MG/1
1 TABLET, FILM COATED ORAL 2 TIMES DAILY
Qty: 20 TABLET | Refills: 0 | Status: SHIPPED | OUTPATIENT
Start: 2022-09-20 | End: 2022-09-30

## 2022-09-20 NOTE — PROGRESS NOTES
22  Yessi David : 1975 Sex: male  Age 51 y.o. Subjective:  Chief Complaint   Patient presents with    Otalgia     Lt earache since the weekend / Rt ear started yesterday     Sinusitis     Since last Thursday / tried [place] [employer] which did not help / took mucinex        HPI:   Yessi David , 51 y.o. male presents to the clinic for evaluation of sinus congestion x 5 days. The patient also reports cough, ear ache, loss of taste / smell, and sore throat. The patient has taken Mucinex for symptoms. The patient reports worsening symptoms over time. The patient denies known ill exposure. The patient denies hx of COVID-19. The patient denies headache, rash, and fever. The patient also denies chest pain, abdominal pain, shortness of breath, and nausea / vomiting / diarrhea. ROS:   Unless otherwise stated in this report the patient's positive and negative responses for review of systems for constitutional, eyes, ENT, cardiovascular, respiratory, gastrointestinal, neurological, , musculoskeletal, and integument systems and related systems to the presenting problem are either stated in the history of present illness or were not pertinent or were negative for the symptoms and/or complaints related to the presenting medical problem. Positives and pertinent negatives as per HPI. All others reviewed and are negative. PMH:     Past Medical History:   Diagnosis Date    Cancer ([address]     Oral cancer    Class 2 severe obesity due to excess calories with serious comorbidity and body mass index (BMI) of 36.0 to 36.9 in adult [facility])     Hypertension     Kidney stone     Pancreatitis     Psoriasis     Renal calculi        Past Surgical History:   Procedure Laterality Date    COLONOSCOPY N/A 2022    COLONOSCOPY DIAGNOSTIC performed by Marcos Castellanos MD at [address] [place] [address]      Due to oral cancer.  Also had part or tounge removed    UPPER GASTROINTESTINAL ENDOSCOPY N/A 2/11/2022    EGD ESOPHAGOGASTRODUODENOSCOPY performed by Tr Headley DO at [address] [place] [address] History   Problem Relation Age of Onset    Other Mother         Valvular disease    Coronary Art Dis Father        Medications:     Current Outpatient Medications:     amoxicillin-clavulanate (AUGMENTIN) 875-125 MG per tablet, Take 1 tablet by mouth 2 times daily for 10 days, Disp: 20 tablet, Rfl: 0    dextromethorphan-guaiFENesin (ROBITUSSIN-DM)  MG/5ML syrup, Take 10 mLs by mouth every 4 hours as needed for Cough, Disp: 240 mL, Rfl: 0    pantoprazole (PROTONIX) 40 MG tablet, Take 1 tablet by mouth every morning (before breakfast), Disp: 30 tablet, Rfl: 0    losartan (COZAAR) 25 MG tablet, Take 25 mg by mouth daily, Disp: , Rfl:     clobetasol (TEMOVATE) 0.05 % cream, Apply 1 each topically 2 times daily Apply topically 2 times daily. , Disp: , Rfl:     oxymetazoline (AFRIN) 0.05 % nasal spray, 2 sprays by Nasal route 2 times daily, Disp: , Rfl:     amLODIPine (NORVASC) 5 MG tablet, Take 5 mg by mouth daily, Disp: , Rfl:     folic acid (FOLVITE) 1 MG tablet, Take 1 tablet by mouth daily (Patient not taking: Reported on 9/20/2022), Disp: 30 tablet, Rfl: 0    vitamin B-1 (THIAMINE) 100 MG tablet, Take 1 tablet by mouth daily (Patient not taking: Reported on 9/20/2022), Disp: 30 tablet, Rfl: 0    Allergies:   No Known Allergies    Social History:     Social History     Tobacco Use    Smoking status: Every Day     Packs/day: 1.00     Types: Cigarettes    Smokeless tobacco: Never   Vaping Use    Vaping Use: Never used   Substance Use Topics    Alcohol use:  Yes     Alcohol/week: 35.0 standard drinks     Types: 35 Shots of liquor per week     Comment: 4-6 cocktails daily    Drug use: Never       Physical Exam:     Vitals:    09/20/22 1333   BP: 128/78   Pulse: (!) 104   Resp: 17   Temp: 98.7 °F (37.1 °C)   TempSrc: Temporal   SpO2: 96%   Weight: 216 lb (98 kg)   Height: 5' 7\" (1.702 m) Imaging: All Radiology results interpreted by Radiologist unless otherwise noted. No orders to display       Assessment / Plan:   The patient's vitals, allergies, medications, and past medical history have been reviewed. Liliana Davila was seen today for otalgia and sinusitis. Diagnoses and all orders for this visit:    Sinobronchitis  -     amoxicillin-clavulanate (AUGMENTIN) 875-125 MG per tablet; Take 1 tablet by mouth 2 times daily for 10 days  -     dextromethorphan-guaiFENesin (ROBITUSSIN-DM)  MG/5ML syrup; Take 10 mLs by mouth every 4 hours as needed for Cough    Non-recurrent acute suppurative otitis media of both ears without spontaneous rupture of tympanic membranes  -     amoxicillin-clavulanate (AUGMENTIN) 875-125 MG per tablet; Take 1 tablet by mouth 2 times daily for 10 days    Cough  -     POCT COVID-19, Antigen  -     dextromethorphan-guaiFENesin (ROBITUSSIN-DM)  MG/5ML syrup; Take 10 mLs by mouth every 4 hours as needed for Cough    Sinus congestion      - Disposition: Home    - Educational material printed for patient's review and were included in patient instructions. After Visit Summary was given to patient at the end of visit. - Encouraged oral fluids and rest. Discussed symptomatic treatments with patient today including Tylenol prn for fever / pain. Schedule a follow-up with PCP in 2-3 days. Red flag symptoms were discussed with the patient today. The patient is directed to go the ED if symptoms change or worsen. Pt verbalizes understanding and is in agreement with plan of care. All questions answered. SIGNATURE: BLAZE [place]    *NOTE: This report was transcribed using voice recognition software. Every effort was made to ensure accuracy; however, inadvertent computerized transcription errors may be present.

## 2022-10-28 ENCOUNTER — TELEPHONE (OUTPATIENT)
Dept: SLEEP CENTER | Age: 47
End: 2022-10-28

## 2022-10-31 ENCOUNTER — TELEPHONE (OUTPATIENT)
Dept: SLEEP CENTER | Age: 47
End: 2022-10-31

## 2022-11-16 ENCOUNTER — TELEPHONE (OUTPATIENT)
Dept: SURGERY | Age: 47
End: 2022-11-16

## 2022-11-16 ENCOUNTER — TELEPHONE (OUTPATIENT)
Dept: SLEEP CENTER | Age: 47
End: 2022-11-16

## 2022-11-16 NOTE — TELEPHONE ENCOUNTER
Pt called in to [facility] an appt for hernia, we have a referral, [facility] for 11/21/22. Pt wants to know if he can also be seen for hemorrhoids, he said he has lost quite a bit of blood, he had to have blood transfusion. Testing completed at [address] [place] [address],[address]. Please, advise.  Thank you

## 2022-11-21 ENCOUNTER — INITIAL CONSULT (OUTPATIENT)
Dept: SURGERY | Age: 47
End: 2022-11-21
Payer: COMMERCIAL

## 2022-11-21 VITALS
SYSTOLIC BLOOD PRESSURE: 111 MMHG | HEART RATE: 109 BPM | TEMPERATURE: 97.2 F | DIASTOLIC BLOOD PRESSURE: 68 MMHG | BODY MASS INDEX: 33.74 KG/M2 | HEIGHT: 67 IN | WEIGHT: 215 LBS

## 2022-11-21 DIAGNOSIS — K42.9 UMBILICAL HERNIA WITHOUT OBSTRUCTION AND WITHOUT GANGRENE: Primary | ICD-10-CM

## 2022-11-21 DIAGNOSIS — K64.2 GRADE III HEMORRHOIDS: ICD-10-CM

## 2022-11-21 PROCEDURE — 99204 OFFICE O/P NEW MOD 45 MIN: CPT | Performed by: SURGERY

## 2022-11-21 NOTE — PROGRESS NOTES
General Surgery History and Physical    Patient's Name/Date of Birth: Andreina Chacon / 1975    Date: November 21, 2022     Surgeon: Eligio Landon M.D.    PCP: French Lugo DO     Chief Complaint: umbilical hernia and hemorrhoids    HPI:   Andreina Chacon is a 49 y.o. male who presents for evaluation of umbilical hernia that is enlarging and becoming more painful, tolerating a diet and moving bowels. Also has bad hemorrhoids      Past Medical History:   Diagnosis Date    Cancer ([address]     Oral cancer    Class 2 severe obesity due to excess calories with serious comorbidity and body mass index (BMI) of 36.0 to 36.9 in [facility])     Hypertension     Kidney stone     Pancreatitis     Psoriasis     Renal calculi        Past Surgical History:   Procedure Laterality Date    COLONOSCOPY N/A 2/13/2022    COLONOSCOPY DIAGNOSTIC performed by Lisseth Miller MD at [address] [place] [address]      Due to oral cancer.  Also had part or tounge removed    UPPER GASTROINTESTINAL ENDOSCOPY N/A 2/11/2022    EGD ESOPHAGOGASTRODUODENOSCOPY performed by Alexey Manuel DO at [place] [address]       Current Outpatient Medications   Medication Sig Dispense Refill    losartan (COZAAR) 25 MG tablet Take 25 mg by mouth daily      amLODIPine (NORVASC) 5 MG tablet Take 5 mg by mouth daily      dextromethorphan-guaiFENesin (ROBITUSSIN-DM)  MG/5ML syrup Take 10 mLs by mouth every 4 hours as needed for Cough (Patient not taking: Reported on 72/02/7309) 904 mL 0    folic acid (FOLVITE) 1 MG tablet Take 1 tablet by mouth daily (Patient not taking: No sig reported) 30 tablet 0    pantoprazole (PROTONIX) 40 MG tablet Take 1 tablet by mouth every morning (before breakfast) (Patient not taking: Reported on 11/21/2022) 30 tablet 0    vitamin B-1 (THIAMINE) 100 MG tablet Take 1 tablet by mouth daily (Patient not taking: No sig reported) 30 tablet 0    clobetasol (TEMOVATE) 0.05 % cream Apply 1 each topically 2 times daily Apply topically 2 times daily. (Patient not taking: Reported on 11/21/2022)      oxymetazoline (AFRIN) 0.05 % nasal spray 2 sprays by Nasal route 2 times daily (Patient not taking: Reported on 11/21/2022)       No current facility-administered medications for this visit. No Known Allergies    The patient has a family history that is negative for severe cardiovascular or respiratory issues, negative for reaction to anesthesia. Social History     Socioeconomic History    Marital status: Single     Spouse name: Not on file    Number of children: 2    Years of education: Not on file    Highest education level: Not on file   Occupational History    Occupation: [employer]    Tobacco Use    Smoking status: Every Day     Packs/day: 1.00     Types: Cigarettes    Smokeless tobacco: Never   Vaping Use    Vaping Use: Never used   Substance and Sexual Activity    Alcohol use:  Yes     Alcohol/week: 35.0 standard drinks     Types: 35 Shots of liquor per week     Comment: 4-6 cocktails daily    Drug use: Never    Sexual activity: Not on file   Other Topics Concern    Not on file   Social History Narrative    Not on file     Social Determinants of Health     Financial Resource Strain: Not on file   Food Insecurity: Not on file   Transportation Needs: Not on file   Physical Activity: Not on file   Stress: Not on file   Social Connections: Not on file   Intimate Partner Violence: Not on file   Housing Stability: Not on file           Review of Systems  Review of Systems -  General ROS: negative for - chills, fatigue or malaise  ENT ROS: negative for - hearing change, nasal congestion or nasal discharge  Allergy and Immunology ROS: negative for - hives, itchy/watery eyes or nasal congestion  Hematological and Lymphatic ROS: negative for - blood clots, blood transfusions, bruising or fatigue  Endocrine ROS: negative for - malaise/lethargy, mood swings, palpitations or polydipsia/polyuria  Breast ROS: negative for - new or changing breast lumps or nipple changes  Respiratory ROS: negative for - sputum changes, stridor, tachypnea or wheezing  Cardiovascular ROS: negative for - irregular heartbeat, loss of consciousness, murmur or orthopnea  Gastrointestinal ROS: negative for - constipation, diarrhea, gas/bloating, heartburn or hematemesis  Genito-Urinary ROS: negative for -  genital discharge, genital ulcers or hematuria  Musculoskeletal ROS: negative for - gait disturbance, muscle pain or muscular weakness    Physical exam:   /68   Pulse (!) 109   Temp 97.2 °F (36.2 °C) (Temporal)   Ht 5' 7\" (1.702 m)   Wt 215 lb (97.5 kg)   BMI 33.67 kg/m²   General appearance:  NAD  Pyscho/social: negative for tremors and hallucinations  Head: NCAT, PERRLA, EOMI, red conjunctiva  Neck: supple, no masses  Lungs: CTAB, equal chest rise bilateral  Heart: Reg rate  Abdomen: soft, minimally tender around reducible umbilical hernia 4.8ON in size, nondistended  Skin; no lesions  Rectal: hemorrhoids  Gu: no cva tenderness  Extremities: extremities normal, atraumatic, no cyanosis or edema    Assessment:  49 y.o. male with umbilical hernia and hemorrhoids    Plan: To OR for hemorrhoidectomy and then for repair of umbilical hernia  Discussed the risk, benefits and alternatives of surgery including wound infections, bleeding, scar  and the risks of  anesthetic including MI, CVA, sudden death or reactions to anesthetic medications. The patient understands the risks and alternatives. All questions were answered to the patient's satisfaction and they freely signed the consent.     Jameson Sage MD  3:38 PM  11/21/2022

## 2022-11-22 ENCOUNTER — TELEPHONE (OUTPATIENT)
Dept: SURGERY | Age: 47
End: 2022-11-22

## 2022-11-22 PROBLEM — K64.2 GRADE III HEMORRHOIDS: Status: ACTIVE | Noted: 2022-11-22

## 2022-11-22 PROBLEM — K42.9 UMBILICAL HERNIA: Status: ACTIVE | Noted: 2022-11-22

## 2022-11-22 NOTE — TELEPHONE ENCOUNTER
Per the order of Dr. Rubén Tracy, patient has been scheduled for hemorrhoidectomy on 22. Patient instructed to please contact our office with any questions. Procedure scheduled through [facility]. Dr. Rubén Tracy to enter orders. Prior Authorization Form:      DEMOGRAPHICS:                     Patient Name:  Pepe May  Patient :  1975            Insurance:  Payor: Nii Stallworth / Plan: [facility] PPO / Product Type: *No Product type* /   Insurance ID Number:    Payer/Plan Subscr  Sex Relation Sub. Ins. ID Effective Group Num   Kelsie Klein 1975 Male Self [id number] 19 [id number]                                   [id number]         DIAGNOSIS & PROCEDURE:                       Procedure/Operation: hemorrhoidectomy           CPT Code: 18345    Diagnosis:  hemorrhoids    ICD10 Code: K64.2    Location:  [place] [address]    Surgeon:  Camilo Finley INFORMATION:                          Date: 22    Time: TBD              Anesthesia:  General                                                       Status:  Outpatient        Special Comments:  N/A       Electronically signed by Ashanti Amezquita LPN on 10/73/9013 at 8:01 AM

## 2022-11-22 NOTE — TELEPHONE ENCOUNTER
Per the order of Dr. Sohail Ortiz, patient has been scheduled for Lap poss open umbilical hernia repair w mesh on 22. Patient instructed to please contact our office with any questions. Procedure scheduled through [facility]. Dr. Joseline Marquis to enter orders. Prior Authorization Form:      DEMOGRAPHICS:                     Patient Name:  Abdifatah Galvan  Patient :  1975            Insurance:  Payor: Martir Flirt / Plan: [facility] PPO / Product Type: *No Product type* /   Insurance ID Number:    Payer/Plan Subscr  Sex Relation Sub. Ins. ID Effective Group Num   1.  [address] -* [place] DURON 1975 Male Self [id number] 19 [id number]                                    [id number]         DIAGNOSIS & PROCEDURE:                       Procedure/Operation: Laparoscopic possible open umbilical hernia repair with mesh           CPT Code: 56811    Diagnosis:  Umbilical hernia    CDT43 Code: K42.9    Location:  [place] [address]    Surgeon:  Elizabeth Stokes INFORMATION:                          Date: 22    Time: TBD              Anesthesia:  General                                                       Status:  Outpatient        Special Comments:  N/A       Electronically signed by Atul Rojas LPN on  at 8:08 AM

## 2022-11-28 ENCOUNTER — PREP FOR PROCEDURE (OUTPATIENT)
Dept: SURGERY | Age: 47
End: 2022-11-28

## 2022-11-28 RX ORDER — SODIUM CHLORIDE 9 MG/ML
INJECTION, SOLUTION INTRAVENOUS PRN
Status: CANCELLED | OUTPATIENT
Start: 2022-11-28

## 2022-11-28 RX ORDER — SODIUM CHLORIDE 0.9 % (FLUSH) 0.9 %
5-40 SYRINGE (ML) INJECTION EVERY 12 HOURS SCHEDULED
Status: CANCELLED | OUTPATIENT
Start: 2022-11-28

## 2022-11-28 RX ORDER — SODIUM CHLORIDE 0.9 % (FLUSH) 0.9 %
5-40 SYRINGE (ML) INJECTION PRN
Status: CANCELLED | OUTPATIENT
Start: 2022-11-28

## 2022-11-29 ENCOUNTER — HOSPITAL ENCOUNTER (OUTPATIENT)
Dept: SLEEP CENTER | Age: 47
Discharge: HOME | End: 2022-11-29
Payer: COMMERCIAL

## 2022-11-29 DIAGNOSIS — G47.33 OSA (OBSTRUCTIVE SLEEP APNEA): Primary | ICD-10-CM

## 2022-11-29 PROCEDURE — 95800 SLP STDY UNATTENDED: CPT

## 2022-12-01 ENCOUNTER — ANESTHESIA EVENT (OUTPATIENT)
Dept: OPERATING ROOM | Age: 47
End: 2022-12-01
Payer: COMMERCIAL

## 2022-12-01 NOTE — PROGRESS NOTES
[facility]                                                                                                                    PRE OP INSTRUCTIONS FOR  Agnes Ross        Date: 12/1/2022    Date of surgery: 12/2/2022    Arrival Time: Hospital will call you between 5pm and 7pm with your final arrival time for surgery    Nothing by mouth (NPO) as instructed.__________midnight __________________________________________________________    Take the following medications with a small sip of water on the morning of Surgery: take amlodipine     Diabetics may take evening dose of insulin but none after midnight. If you feel symptomatic or low blood sugar morning of surgery drink 1-2 ounces of apple juice only. Aspirin, Ibuprofen, Advil, Naproxen, Vitamin E and other Anti-inflammatory products should be stopped  before surgery  as directed by your physician. Take Tylenol only unless instructed otherwise by your surgeon. Check with your Doctor regarding stopping Plavix, Coumadin, Lovenox, Eliquis, Effient, or other blood thinners. Do not smoke,use illicit drugs and do not drink any alcoholic beverages 24 hours prior to surgery. You may brush your teeth the morning of surgery. DO NOT SWALLOW WATER    You MUST make arrangements for a responsible adult to take you home after your surgery. You will not be allowed to leave alone or drive yourself home. It is strongly suggested someone stay with you the first 24 hrs. Your surgery will be cancelled if you do not have a ride home. PEDIATRIC PATIENTS ONLY:  A parent/legal guardian must accompany a child scheduled for surgery and plan to stay at the hospital until the child is discharged. Please do not bring other children with you.     Please wear simple, loose fitting clothing to the hospital.  Brittani Goldberg not bring valuables (money, credit cards, checkbooks, etc.) Do not wear any makeup (including no eye makeup) or nail polish on your fingers or toes. DO NOT wear any jewelry or piercings on day of surgery. All body piercing jewelry must be removed. Shower the night before surgery with __x_Antibacterial soap /SRI WIPES________    TOTAL JOINT REPLACEMENT/HYSTERECTOMY PATIENTS ONLY---Remember to bring Blood Bank bracelet to the hospital on the day of surgery. If you have a Living Will and Durable Power of  for Healthcare, please bring in a copy. If appropriate bring crutches, inspirex, WALKER, CANE etc... Notify your Surgeon if you develop any illness between now and surgery time, cough, cold, fever, sore throat, nausea, vomiting, etc.  Please notify your surgeon if you experience dizziness, shortness of breath or blurred vision between now & the time of your surgery. If you have ___dentures, they will be removed before going to the OR; we will provide you a container. If you wear ___contact lenses or ___glasses, they will be removed; please bring a case for them. To provide excellent care visitors will be limited to 2 in the room at any given time. Please bring picture ID and insurance card. During flu season no children under the age of 15 are permitted in the hospital for the safety of all patients. Other                  Please call AMBULATORY CARE if you have any further questions.    [address]     [address]

## 2022-12-02 ENCOUNTER — ANESTHESIA (OUTPATIENT)
Dept: OPERATING ROOM | Age: 47
End: 2022-12-02
Payer: COMMERCIAL

## 2022-12-02 ENCOUNTER — PREP FOR PROCEDURE (OUTPATIENT)
Dept: SURGERY | Age: 47
End: 2022-12-02

## 2022-12-02 ENCOUNTER — TELEPHONE (OUTPATIENT)
Dept: SLEEP CENTER | Age: 47
End: 2022-12-02

## 2022-12-02 ENCOUNTER — HOSPITAL ENCOUNTER (OUTPATIENT)
Age: 47
Setting detail: OUTPATIENT SURGERY
Discharge: HOME | End: 2022-12-02
Attending: SURGERY | Admitting: SURGERY
Payer: COMMERCIAL

## 2022-12-02 VITALS
OXYGEN SATURATION: 92 % | SYSTOLIC BLOOD PRESSURE: 119 MMHG | HEART RATE: 89 BPM | RESPIRATION RATE: 20 BRPM | BODY MASS INDEX: 33.27 KG/M2 | TEMPERATURE: 98 F | WEIGHT: 212 LBS | HEIGHT: 67 IN | DIASTOLIC BLOOD PRESSURE: 63 MMHG

## 2022-12-02 DIAGNOSIS — G47.33 OSA (OBSTRUCTIVE SLEEP APNEA): Primary | ICD-10-CM

## 2022-12-02 DIAGNOSIS — K64.2 GRADE III HEMORRHOIDS: ICD-10-CM

## 2022-12-02 DIAGNOSIS — R11.0 POSTOPERATIVE NAUSEA: Primary | ICD-10-CM

## 2022-12-02 DIAGNOSIS — Z98.890 POSTOPERATIVE NAUSEA: Primary | ICD-10-CM

## 2022-12-02 LAB
ANION GAP SERPL CALCULATED.3IONS-SCNC: 12 MMOL/L (ref 7–16)
BUN BLDV-MCNC: 7 MG/DL (ref 6–20)
CALCIUM SERPL-MCNC: 9.2 MG/DL (ref 8.6–10.2)
CHLORIDE BLD-SCNC: 102 MMOL/L (ref 98–107)
CO2: 25 MMOL/L (ref 22–29)
CREAT SERPL-MCNC: 1 MG/DL (ref 0.7–1.2)
EKG ATRIAL RATE: 105 BPM
EKG P AXIS: 41 DEGREES
EKG P-R INTERVAL: 154 MS
EKG Q-T INTERVAL: 376 MS
EKG QRS DURATION: 94 MS
EKG QTC CALCULATION (BAZETT): 496 MS
EKG R AXIS: 18 DEGREES
EKG T AXIS: 20 DEGREES
EKG VENTRICULAR RATE: 105 BPM
GFR SERPL CREATININE-BSD FRML MDRD: >60 ML/MIN/1.73
GLUCOSE BLD-MCNC: 170 MG/DL (ref 74–99)
HCT VFR BLD CALC: 34 % (ref 37–54)
HEMOGLOBIN: 9.8 G/DL (ref 12.5–16.5)
MCH RBC QN AUTO: 21.6 PG (ref 26–35)
MCHC RBC AUTO-ENTMCNC: 28.8 % (ref 32–34.5)
MCV RBC AUTO: 74.9 FL (ref 80–99.9)
PDW BLD-RTO: 19.8 FL (ref 11.5–15)
PLATELET # BLD: 337 E9/L (ref 130–450)
PMV BLD AUTO: 9.8 FL (ref 7–12)
POTASSIUM SERPL-SCNC: 4 MMOL/L (ref 3.5–5)
RBC # BLD: 4.54 E12/L (ref 3.8–5.8)
SODIUM BLD-SCNC: 139 MMOL/L (ref 132–146)
WBC # BLD: 9.1 E9/L (ref 4.5–11.5)

## 2022-12-02 PROCEDURE — 6370000000 HC RX 637 (ALT 250 FOR IP): Performed by: SURGERY

## 2022-12-02 PROCEDURE — 6360000002 HC RX W HCPCS: Performed by: SURGERY

## 2022-12-02 PROCEDURE — 6360000002 HC RX W HCPCS

## 2022-12-02 PROCEDURE — 7100000011 HC PHASE II RECOVERY - ADDTL 15 MIN: Performed by: SURGERY

## 2022-12-02 PROCEDURE — 7100000001 HC PACU RECOVERY - ADDTL 15 MIN: Performed by: SURGERY

## 2022-12-02 PROCEDURE — 36415 COLL VENOUS BLD VENIPUNCTURE: CPT

## 2022-12-02 PROCEDURE — 3600000012 HC SURGERY LEVEL 2 ADDTL 15MIN: Performed by: SURGERY

## 2022-12-02 PROCEDURE — 85027 COMPLETE CBC AUTOMATED: CPT

## 2022-12-02 PROCEDURE — 2580000003 HC RX 258: Performed by: ANESTHESIOLOGY

## 2022-12-02 PROCEDURE — 3600000002 HC SURGERY LEVEL 2 BASE: Performed by: SURGERY

## 2022-12-02 PROCEDURE — 99024 POSTOP FOLLOW-UP VISIT: CPT | Performed by: SURGERY

## 2022-12-02 PROCEDURE — 88304 TISSUE EXAM BY PATHOLOGIST: CPT

## 2022-12-02 PROCEDURE — 2709999900 HC NON-CHARGEABLE SUPPLY: Performed by: SURGERY

## 2022-12-02 PROCEDURE — 7100000010 HC PHASE II RECOVERY - FIRST 15 MIN: Performed by: SURGERY

## 2022-12-02 PROCEDURE — 7100000000 HC PACU RECOVERY - FIRST 15 MIN: Performed by: SURGERY

## 2022-12-02 PROCEDURE — 3700000001 HC ADD 15 MINUTES (ANESTHESIA): Performed by: SURGERY

## 2022-12-02 PROCEDURE — 2580000003 HC RX 258

## 2022-12-02 PROCEDURE — 93005 ELECTROCARDIOGRAM TRACING: CPT | Performed by: ANESTHESIOLOGY

## 2022-12-02 PROCEDURE — 6360000002 HC RX W HCPCS: Performed by: ANESTHESIOLOGY

## 2022-12-02 PROCEDURE — 80048 BASIC METABOLIC PNL TOTAL CA: CPT

## 2022-12-02 PROCEDURE — 2500000003 HC RX 250 WO HCPCS

## 2022-12-02 PROCEDURE — 6370000000 HC RX 637 (ALT 250 FOR IP)

## 2022-12-02 PROCEDURE — 2580000003 HC RX 258: Performed by: SURGERY

## 2022-12-02 PROCEDURE — 2500000003 HC RX 250 WO HCPCS: Performed by: SURGERY

## 2022-12-02 PROCEDURE — 46260 REMOVE IN/EX HEM GROUPS 2+: CPT | Performed by: SURGERY

## 2022-12-02 PROCEDURE — 3700000000 HC ANESTHESIA ATTENDED CARE: Performed by: SURGERY

## 2022-12-02 RX ORDER — FENTANYL CITRATE 50 UG/ML
INJECTION, SOLUTION INTRAMUSCULAR; INTRAVENOUS PRN
Status: DISCONTINUED | OUTPATIENT
Start: 2022-12-02 | End: 2022-12-02 | Stop reason: SDUPTHER

## 2022-12-02 RX ORDER — IPRATROPIUM BROMIDE AND ALBUTEROL SULFATE 2.5; .5 MG/3ML; MG/3ML
SOLUTION RESPIRATORY (INHALATION)
Status: COMPLETED
Start: 2022-12-02 | End: 2022-12-02

## 2022-12-02 RX ORDER — SODIUM CHLORIDE, SODIUM LACTATE, POTASSIUM CHLORIDE, CALCIUM CHLORIDE 600; 310; 30; 20 MG/100ML; MG/100ML; MG/100ML; MG/100ML
INJECTION, SOLUTION INTRAVENOUS CONTINUOUS
OUTPATIENT
Start: 2022-12-02

## 2022-12-02 RX ORDER — METHOCARBAMOL 500 MG/1
500 TABLET, FILM COATED ORAL 4 TIMES DAILY
Qty: 40 TABLET | Refills: 0 | Status: SHIPPED | OUTPATIENT
Start: 2022-12-02 | End: 2022-12-12

## 2022-12-02 RX ORDER — SODIUM CHLORIDE 9 MG/ML
INJECTION, SOLUTION INTRAVENOUS PRN
Status: DISCONTINUED | OUTPATIENT
Start: 2022-12-02 | End: 2022-12-02 | Stop reason: HOSPADM

## 2022-12-02 RX ORDER — IPRATROPIUM BROMIDE AND ALBUTEROL SULFATE 2.5; .5 MG/3ML; MG/3ML
1 SOLUTION RESPIRATORY (INHALATION)
Status: COMPLETED | OUTPATIENT
Start: 2022-12-02 | End: 2022-12-02

## 2022-12-02 RX ORDER — SODIUM CHLORIDE 0.9 % (FLUSH) 0.9 %
5-40 SYRINGE (ML) INJECTION PRN
OUTPATIENT
Start: 2022-12-02

## 2022-12-02 RX ORDER — MIDAZOLAM HYDROCHLORIDE 1 MG/ML
INJECTION INTRAMUSCULAR; INTRAVENOUS PRN
Status: DISCONTINUED | OUTPATIENT
Start: 2022-12-02 | End: 2022-12-02 | Stop reason: SDUPTHER

## 2022-12-02 RX ORDER — LIDOCAINE HYDROCHLORIDE 20 MG/ML
INJECTION, SOLUTION INTRAVENOUS PRN
Status: DISCONTINUED | OUTPATIENT
Start: 2022-12-02 | End: 2022-12-02 | Stop reason: SDUPTHER

## 2022-12-02 RX ORDER — MORPHINE SULFATE 2 MG/ML
INJECTION, SOLUTION INTRAMUSCULAR; INTRAVENOUS
Status: COMPLETED
Start: 2022-12-02 | End: 2022-12-02

## 2022-12-02 RX ORDER — MORPHINE SULFATE 2 MG/ML
2 INJECTION, SOLUTION INTRAMUSCULAR; INTRAVENOUS EVERY 5 MIN PRN
Status: DISCONTINUED | OUTPATIENT
Start: 2022-12-02 | End: 2022-12-02 | Stop reason: HOSPADM

## 2022-12-02 RX ORDER — BUPIVACAINE HYDROCHLORIDE AND EPINEPHRINE 2.5; 5 MG/ML; UG/ML
INJECTION, SOLUTION EPIDURAL; INFILTRATION; INTRACAUDAL; PERINEURAL PRN
Status: DISCONTINUED | OUTPATIENT
Start: 2022-12-02 | End: 2022-12-02 | Stop reason: ALTCHOICE

## 2022-12-02 RX ORDER — SODIUM CHLORIDE 0.9 % (FLUSH) 0.9 %
5-40 SYRINGE (ML) INJECTION PRN
Status: DISCONTINUED | OUTPATIENT
Start: 2022-12-02 | End: 2022-12-02 | Stop reason: HOSPADM

## 2022-12-02 RX ORDER — ONDANSETRON 2 MG/ML
4 INJECTION INTRAMUSCULAR; INTRAVENOUS
Status: DISCONTINUED | OUTPATIENT
Start: 2022-12-02 | End: 2022-12-02 | Stop reason: HOSPADM

## 2022-12-02 RX ORDER — SODIUM CHLORIDE 0.9 % (FLUSH) 0.9 %
5-40 SYRINGE (ML) INJECTION EVERY 12 HOURS SCHEDULED
Status: DISCONTINUED | OUTPATIENT
Start: 2022-12-02 | End: 2022-12-02 | Stop reason: HOSPADM

## 2022-12-02 RX ORDER — SUCCINYLCHOLINE/SOD CL,ISO/PF 200MG/10ML
SYRINGE (ML) INTRAVENOUS PRN
Status: DISCONTINUED | OUTPATIENT
Start: 2022-12-02 | End: 2022-12-02 | Stop reason: SDUPTHER

## 2022-12-02 RX ORDER — SODIUM CHLORIDE 9 MG/ML
INJECTION, SOLUTION INTRAVENOUS PRN
OUTPATIENT
Start: 2022-12-02

## 2022-12-02 RX ORDER — LABETALOL HYDROCHLORIDE 5 MG/ML
10 INJECTION, SOLUTION INTRAVENOUS
Status: DISCONTINUED | OUTPATIENT
Start: 2022-12-02 | End: 2022-12-02 | Stop reason: HOSPADM

## 2022-12-02 RX ORDER — SODIUM CHLORIDE, SODIUM LACTATE, POTASSIUM CHLORIDE, CALCIUM CHLORIDE 600; 310; 30; 20 MG/100ML; MG/100ML; MG/100ML; MG/100ML
INJECTION, SOLUTION INTRAVENOUS CONTINUOUS
Status: DISCONTINUED | OUTPATIENT
Start: 2022-12-02 | End: 2022-12-02 | Stop reason: HOSPADM

## 2022-12-02 RX ORDER — FENTANYL CITRATE 50 UG/ML
25 INJECTION, SOLUTION INTRAMUSCULAR; INTRAVENOUS EVERY 5 MIN PRN
Status: DISCONTINUED | OUTPATIENT
Start: 2022-12-02 | End: 2022-12-02 | Stop reason: HOSPADM

## 2022-12-02 RX ORDER — SODIUM CHLORIDE, SODIUM LACTATE, POTASSIUM CHLORIDE, CALCIUM CHLORIDE 600; 310; 30; 20 MG/100ML; MG/100ML; MG/100ML; MG/100ML
INJECTION, SOLUTION INTRAVENOUS CONTINUOUS PRN
Status: DISCONTINUED | OUTPATIENT
Start: 2022-12-02 | End: 2022-12-02 | Stop reason: SDUPTHER

## 2022-12-02 RX ORDER — PROPOFOL 10 MG/ML
INJECTION, EMULSION INTRAVENOUS PRN
Status: DISCONTINUED | OUTPATIENT
Start: 2022-12-02 | End: 2022-12-02 | Stop reason: SDUPTHER

## 2022-12-02 RX ORDER — FENTANYL CITRATE 50 UG/ML
INJECTION, SOLUTION INTRAMUSCULAR; INTRAVENOUS
Status: COMPLETED
Start: 2022-12-02 | End: 2022-12-02

## 2022-12-02 RX ORDER — OXYCODONE HYDROCHLORIDE AND ACETAMINOPHEN 5; 325 MG/1; MG/1
1 TABLET ORAL EVERY 6 HOURS PRN
Qty: 28 TABLET | Refills: 0 | Status: SHIPPED | OUTPATIENT
Start: 2022-12-02 | End: 2022-12-09

## 2022-12-02 RX ORDER — DEXAMETHASONE SODIUM PHOSPHATE 10 MG/ML
INJECTION, SOLUTION INTRAMUSCULAR; INTRAVENOUS PRN
Status: DISCONTINUED | OUTPATIENT
Start: 2022-12-02 | End: 2022-12-02 | Stop reason: SDUPTHER

## 2022-12-02 RX ORDER — MIDAZOLAM HYDROCHLORIDE 1 MG/ML
2 INJECTION INTRAMUSCULAR; INTRAVENOUS
Status: DISCONTINUED | OUTPATIENT
Start: 2022-12-02 | End: 2022-12-02 | Stop reason: HOSPADM

## 2022-12-02 RX ORDER — IBUPROFEN 800 MG/1
800 TABLET ORAL EVERY 6 HOURS PRN
Qty: 20 TABLET | Refills: 0 | Status: SHIPPED | OUTPATIENT
Start: 2022-12-02

## 2022-12-02 RX ORDER — MEPERIDINE HYDROCHLORIDE 25 MG/ML
12.5 INJECTION INTRAMUSCULAR; INTRAVENOUS; SUBCUTANEOUS EVERY 5 MIN PRN
Status: DISCONTINUED | OUTPATIENT
Start: 2022-12-02 | End: 2022-12-02 | Stop reason: HOSPADM

## 2022-12-02 RX ORDER — ONDANSETRON 4 MG/1
4 TABLET, FILM COATED ORAL 3 TIMES DAILY PRN
Qty: 15 TABLET | Refills: 0 | Status: SHIPPED | OUTPATIENT
Start: 2022-12-02

## 2022-12-02 RX ORDER — KETOROLAC TROMETHAMINE 30 MG/ML
30 INJECTION, SOLUTION INTRAMUSCULAR; INTRAVENOUS
Status: COMPLETED | OUTPATIENT
Start: 2022-12-02 | End: 2022-12-02

## 2022-12-02 RX ORDER — HYDRALAZINE HYDROCHLORIDE 20 MG/ML
10 INJECTION INTRAMUSCULAR; INTRAVENOUS
Status: DISCONTINUED | OUTPATIENT
Start: 2022-12-02 | End: 2022-12-02 | Stop reason: HOSPADM

## 2022-12-02 RX ORDER — SODIUM CHLORIDE 0.9 % (FLUSH) 0.9 %
5-40 SYRINGE (ML) INJECTION EVERY 12 HOURS SCHEDULED
OUTPATIENT
Start: 2022-12-02

## 2022-12-02 RX ORDER — DIBUCAINE 0.28 G/28G
OINTMENT TOPICAL PRN
Status: DISCONTINUED | OUTPATIENT
Start: 2022-12-02 | End: 2022-12-02 | Stop reason: ALTCHOICE

## 2022-12-02 RX ORDER — ONDANSETRON 2 MG/ML
INJECTION INTRAMUSCULAR; INTRAVENOUS PRN
Status: DISCONTINUED | OUTPATIENT
Start: 2022-12-02 | End: 2022-12-02 | Stop reason: SDUPTHER

## 2022-12-02 RX ORDER — KETOROLAC TROMETHAMINE 30 MG/ML
INJECTION, SOLUTION INTRAMUSCULAR; INTRAVENOUS
Status: COMPLETED
Start: 2022-12-02 | End: 2022-12-02

## 2022-12-02 RX ORDER — PROCHLORPERAZINE EDISYLATE 5 MG/ML
5 INJECTION INTRAMUSCULAR; INTRAVENOUS
Status: COMPLETED | OUTPATIENT
Start: 2022-12-02 | End: 2022-12-02

## 2022-12-02 RX ORDER — DIPHENHYDRAMINE HYDROCHLORIDE 50 MG/ML
12.5 INJECTION INTRAMUSCULAR; INTRAVENOUS
Status: DISCONTINUED | OUTPATIENT
Start: 2022-12-02 | End: 2022-12-02 | Stop reason: HOSPADM

## 2022-12-02 RX ADMIN — KETOROLAC TROMETHAMINE 30 MG: 30 INJECTION, SOLUTION INTRAMUSCULAR; INTRAVENOUS at 09:45

## 2022-12-02 RX ADMIN — PROCHLORPERAZINE EDISYLATE 5 MG: 5 INJECTION INTRAMUSCULAR; INTRAVENOUS at 12:02

## 2022-12-02 RX ADMIN — SODIUM CHLORIDE, POTASSIUM CHLORIDE, SODIUM LACTATE AND CALCIUM CHLORIDE: 600; 310; 30; 20 INJECTION, SOLUTION INTRAVENOUS at 07:01

## 2022-12-02 RX ADMIN — FENTANYL CITRATE 100 MCG: 50 INJECTION, SOLUTION INTRAMUSCULAR; INTRAVENOUS at 08:15

## 2022-12-02 RX ADMIN — FENTANYL CITRATE 25 MCG: 50 INJECTION INTRAMUSCULAR; INTRAVENOUS at 10:09

## 2022-12-02 RX ADMIN — CEFAZOLIN 2000 MG: 2 INJECTION, POWDER, FOR SOLUTION INTRAMUSCULAR; INTRAVENOUS at 08:23

## 2022-12-02 RX ADMIN — IPRATROPIUM BROMIDE AND ALBUTEROL SULFATE 1 AMPULE: .5; 2.5 SOLUTION RESPIRATORY (INHALATION) at 10:10

## 2022-12-02 RX ADMIN — ONDANSETRON 4 MG: 2 INJECTION INTRAMUSCULAR; INTRAVENOUS at 08:38

## 2022-12-02 RX ADMIN — FENTANYL CITRATE 25 MCG: 50 INJECTION INTRAMUSCULAR; INTRAVENOUS at 09:55

## 2022-12-02 RX ADMIN — MORPHINE SULFATE 2 MG: 2 INJECTION, SOLUTION INTRAMUSCULAR; INTRAVENOUS at 09:18

## 2022-12-02 RX ADMIN — FENTANYL CITRATE 25 MCG: 50 INJECTION INTRAMUSCULAR; INTRAVENOUS at 10:00

## 2022-12-02 RX ADMIN — Medication 160 MG: at 08:15

## 2022-12-02 RX ADMIN — MORPHINE SULFATE 2 MG: 2 INJECTION, SOLUTION INTRAMUSCULAR; INTRAVENOUS at 10:22

## 2022-12-02 RX ADMIN — SODIUM CHLORIDE, POTASSIUM CHLORIDE, SODIUM LACTATE AND CALCIUM CHLORIDE: 600; 310; 30; 20 INJECTION, SOLUTION INTRAVENOUS at 08:09

## 2022-12-02 RX ADMIN — MIDAZOLAM 2 MG: 1 INJECTION INTRAMUSCULAR; INTRAVENOUS at 08:06

## 2022-12-02 RX ADMIN — KETOROLAC TROMETHAMINE 30 MG: 30 INJECTION, SOLUTION INTRAMUSCULAR at 09:45

## 2022-12-02 RX ADMIN — IPRATROPIUM BROMIDE AND ALBUTEROL SULFATE 1 AMPULE: 2.5; .5 SOLUTION RESPIRATORY (INHALATION) at 10:10

## 2022-12-02 RX ADMIN — MORPHINE SULFATE 2 MG: 2 INJECTION, SOLUTION INTRAMUSCULAR; INTRAVENOUS at 09:28

## 2022-12-02 RX ADMIN — DEXAMETHASONE SODIUM PHOSPHATE 10 MG: 10 INJECTION INTRAMUSCULAR; INTRAVENOUS at 08:15

## 2022-12-02 RX ADMIN — PROPOFOL 200 MG: 10 INJECTION, EMULSION INTRAVENOUS at 08:15

## 2022-12-02 RX ADMIN — SODIUM CHLORIDE, POTASSIUM CHLORIDE, SODIUM LACTATE AND CALCIUM CHLORIDE: 600; 310; 30; 20 INJECTION, SOLUTION INTRAVENOUS at 08:35

## 2022-12-02 RX ADMIN — LIDOCAINE HYDROCHLORIDE 100 MG: 20 INJECTION, SOLUTION INTRAVENOUS at 08:15

## 2022-12-02 ASSESSMENT — PAIN SCALES - GENERAL
PAINLEVEL_OUTOF10: 4
PAINLEVEL_OUTOF10: 6
PAINLEVEL_OUTOF10: 0
PAINLEVEL_OUTOF10: 5
PAINLEVEL_OUTOF10: 6
PAINLEVEL_OUTOF10: 8
PAINLEVEL_OUTOF10: 6
PAINLEVEL_OUTOF10: 6
PAINLEVEL_OUTOF10: 7
PAINLEVEL_OUTOF10: 8

## 2022-12-02 ASSESSMENT — PAIN DESCRIPTION - LOCATION
LOCATION: RECTUM

## 2022-12-02 ASSESSMENT — PAIN DESCRIPTION - PAIN TYPE
TYPE: SURGICAL PAIN

## 2022-12-02 ASSESSMENT — PAIN DESCRIPTION - FREQUENCY
FREQUENCY: CONTINUOUS

## 2022-12-02 ASSESSMENT — PAIN - FUNCTIONAL ASSESSMENT
PAIN_FUNCTIONAL_ASSESSMENT: ACTIVITIES ARE NOT PREVENTED

## 2022-12-02 ASSESSMENT — PAIN DESCRIPTION - ONSET
ONSET: ON-GOING

## 2022-12-02 ASSESSMENT — PAIN DESCRIPTION - DESCRIPTORS
DESCRIPTORS: ACHING;BURNING
DESCRIPTORS: ACHING;DISCOMFORT
DESCRIPTORS: BURNING;SORE
DESCRIPTORS: ACHING;SORE
DESCRIPTORS: ACHING;DISCOMFORT

## 2022-12-02 ASSESSMENT — LIFESTYLE VARIABLES: SMOKING_STATUS: 1

## 2022-12-02 NOTE — ANESTHESIA PRE PROCEDURE
Department of Anesthesiology  Preprocedure Note       Name:  Armen Nyhan   Age:  49 y.o.  :  1975                                          MRN:  [id number]         Date:  2022      Surgeon: Francisco Javier Escoto):  Amie Quezada MD    Procedure: Procedure(s): HEMORRHOIDECTOMY    Medications prior to admission:   Prior to Admission medications    Medication Sig Start Date End Date Taking? Authorizing Provider   losartan (COZAAR) 25 MG tablet Take 25 mg by mouth daily    Historical Provider, MD   clobetasol (TEMOVATE) 0.05 % cream Apply 1 each topically as needed Apply topically 2 times daily.     Historical Provider, MD   amLODIPine (NORVASC) 5 MG tablet Take 5 mg by mouth daily    Historical Provider, MD       Current medications:    Current Facility-Administered Medications   Medication Dose Route Frequency Provider Last Rate Last Admin    lactated ringers infusion   IntraVENous Continuous Terry Kasper,  mL/hr at 22 0701 New Bag at 22 0701    sodium chloride flush 0.9 % injection 5-40 mL  5-40 mL IntraVENous 2 times per day Amie Quezada MD        sodium chloride flush 0.9 % injection 5-40 mL  5-40 mL IntraVENous PRN Amie Quezada MD        0.9 % sodium chloride infusion   IntraVENous PRN Amie Quezada MD        ceFAZolin (ANCEF) 2,000 mg in sterile water 20 mL IV syringe  2,000 mg IntraVENous On Call to [address] [place] [address], MD           Allergies:  No Known Allergies    Problem List:    Patient Active Problem List   Diagnosis Code    GI bleed K92.2    Grade III hemorrhoids H52.4    Umbilical hernia J72.2       Past Medical History:        Diagnosis Date    Cancer [facility])     Oral cancer    Class 2 severe obesity due to excess calories with serious comorbidity and body mass index (BMI) of 36.0 to 36.9 in adult [facility])     Hypertension     Kidney stone     Pancreatitis     Psoriasis     Renal calculi        Past Surgical History:        Procedure Laterality Date    COLONOSCOPY N/A 2/13/2022    COLONOSCOPY DIAGNOSTIC performed by Bernardo Crocker MD at [address]      Due to oral cancer. Also had part or tounge removed    UPPER GASTROINTESTINAL ENDOSCOPY N/A 2/11/2022    EGD ESOPHAGOGASTRODUODENOSCOPY performed by Armen Cerna DO at [address] History:    Social History     Tobacco Use    Smoking status: Every Day     Packs/day: 1.00     Types: Cigarettes    Smokeless tobacco: Former   Substance Use Topics    Alcohol use: Yes     Alcohol/week: 35.0 standard drinks     Types: 35 Shots of liquor per week     Comment: 4-6 cocktails daily                                Ready to quit: Not Answered  Counseling given: Not Answered      Vital Signs (Current):   Vitals:    12/01/22 1402 12/02/22 0645   BP:  109/72   Pulse:  (!) 102   Resp:  16   Temp:  99.2 °F (37.3 °C)   TempSrc:  Infrared   SpO2:  95%   Weight: 210 lb (95.3 kg) 212 lb (96.2 kg)   Height: 5' 7\" (1.702 m) 5' 7\" (1.702 m)                                              BP Readings from Last 3 Encounters:   12/02/22 109/72   11/21/22 111/68   09/20/22 128/78       NPO Status: Time of last liquid consumption: 2300                        Time of last solid consumption: 1200                        Date of last liquid consumption: 12/01/22                        Date of last solid food consumption: 12/01/22    BMI:   Wt Readings from Last 3 Encounters:   12/02/22 212 lb (96.2 kg)   11/21/22 215 lb (97.5 kg)   09/20/22 216 lb (98 kg)     Body mass index is 33.2 kg/m².     CBC:   Lab Results   Component Value Date/Time    WBC 9.1 12/02/2022 06:55 AM    RBC 4.54 12/02/2022 06:55 AM    HGB 9.8 12/02/2022 06:55 AM    HCT 34.0 12/02/2022 06:55 AM    MCV 74.9 12/02/2022 06:55 AM    RDW 19.8 12/02/2022 06:55 AM     12/02/2022 06:55 AM       CMP:   Lab Results   Component Value Date/Time     02/13/2022 05:45 AM    K 3.3 02/13/2022 05:45 AM    K 4.0 02/10/2022 01:38 PM     02/13/2022 05:45 AM    CO2 22 02/13/2022 05:45 AM    BUN 15 02/13/2022 05:45 AM    CREATININE 0.8 02/13/2022 05:45 AM    GFRAA >60 02/13/2022 05:45 AM    LABGLOM >60 02/13/2022 05:45 AM    GLUCOSE 125 02/13/2022 05:45 AM    PROT 7.8 02/13/2022 05:45 AM    CALCIUM 8.9 02/13/2022 05:45 AM    BILITOT 1.1 02/13/2022 05:45 AM    ALKPHOS 80 02/13/2022 05:45 AM    AST 65 02/13/2022 05:45 AM    ALT 25 02/13/2022 05:45 AM       POC Tests: No results for input(s): POCGLU, POCNA, POCK, POCCL, POCBUN, POCHEMO, POCHCT in the last 72 hours. Coags:   Lab Results   Component Value Date/Time    PROTIME 13.9 02/10/2022 04:57 PM    INR 1.2 02/10/2022 04:57 PM       HCG (If Applicable): No results found for: PREGTESTUR, PREGSERUM, HCG, HCGQUANT     ABGs: No results found for: PHART, PO2ART, NAR6CQU, CBN3KPG, BEART, W5DYXSFR     Type & Screen (If Applicable):  No results found for: LABABO, LABRH    Drug/Infectious Status (If Applicable):  No results found for: HIV, HEPCAB    COVID-19 Screening (If Applicable):   Lab Results   Component Value Date/Time    COVID19 Not-Detected 09/20/2022 01:50 PM           Anesthesia Evaluation  Patient summary reviewed no history of anesthetic complications:   Airway: Mallampati: III  TM distance: >3 FB   Neck ROM: full  Mouth opening: > = 3 FB   Dental:          Pulmonary: breath sounds clear to auscultation  (+) current smoker                           Cardiovascular:    (+) hypertension:,         Rhythm: regular             Beta Blocker:  Not on Beta Blocker         Neuro/Psych:   (+) psychiatric history:             ROS comment: ALCOHOL ABUSE  GI/Hepatic/Renal:   (+) GERD:,          ROS comment: GI BLEED  HISTORY OF PANCREATITIS . Endo/Other:    (+) malignancy/cancer (ORAL). ROS comment: ETOH use  Abdominal:             Vascular: negative vascular ROS.          Other Findings:             Anesthesia Plan      general     ASA 3       Induction: intravenous. MIPS: Prophylactic antiemetics administered. Anesthetic plan and risks discussed with patient. Plan discussed with CRNA. DOS STAFF ADDENDUM:    Pt seen and examined, chart reviewed (including anesthesia, drug and allergy history). Anesthetic plan, risks, benefits, alternatives, and personnel involved discussed with patient. Patient verbalized an understanding and agrees to proceed. Plan discussed with care team members and agreed upon.     Jai Griffin MD  Staff Anesthesiologist  7:40 AM    Jai Griffin MD   12/2/2022

## 2022-12-02 NOTE — OP NOTE
DATE OF PROCEDURE:   12/2/2022     PREOPERATIVE DIAGNOSIS:  Grade III internal hemorrhoids. POSTOPERATIVE DIAGNOSIS:  Grade III internal hemorrhoids. PROCEDURE PERFORMED:  Hemorrhoidectomy x3, columns of internal and  external hemorrhoids. SURGEON:  Arti Dillon MD     ASSISTANT:  None. ANESTHESIA:  lmac. COMPLICATIONS:  None. FLUIDS:  Crystalloid. BLOOD LOSS:  Minimal.     DISPOSITION:  To be discharged home. Specimen: hemorrhoidal tissue     INDICATIONS:  This is a 47yo male with the aforementioned  diagnosis. I explained the risks, benefits, potential outcomes, and  alternative treatment to the aforementioned procedure and he agreed to  proceed understanding those risks and potential outcomes. OPERATIVE PROCEDURE:  The patient was brought to the operative suite,   anesthesia LMAC was then placed in a prone  position. He was then prepped and draped in normal sterile condition. Local anesthetic was infiltrated for obturator blocks bilaterally. Once this  was done, the Harmonic device was able to divide and remove  all three  hemorrhoidal plexuses that were engorged and inflamed. Once this was done,  dibucaine ointment and sterile bandage was placed and the patient was  woken up in stable condition.            Genet Franz MD

## 2022-12-02 NOTE — TELEPHONE ENCOUNTER
Call to pt to discuss SS results and recommendation for titration study. And to schedule him a referral appt.  LM with call back #

## 2022-12-02 NOTE — ANESTHESIA PRE PROCEDURE
Department of Anesthesiology  Preprocedure Note       Name:  Fred Grain   Age:  49 y.o.  :  1975                                          MRN:  [id number]         Date:  2022      Surgeon: Sarah Jonas):  Jakub James MD    Procedure: Procedure(s): HEMORRHOIDECTOMY    Medications prior to admission:   Prior to Admission medications    Medication Sig Start Date End Date Taking? Authorizing Provider   losartan (COZAAR) 25 MG tablet Take 25 mg by mouth daily    Historical Provider, MD   clobetasol (TEMOVATE) 0.05 % cream Apply 1 each topically as needed Apply topically 2 times daily.     Historical Provider, MD   amLODIPine (NORVASC) 5 MG tablet Take 5 mg by mouth daily    Historical Provider, MD       Current medications:    Current Facility-Administered Medications   Medication Dose Route Frequency Provider Last Rate Last Admin    lactated ringers infusion   IntraVENous Continuous Terry Kasper,  100 mL/hr at 22 0701 New Bag at 22 0701    sodium chloride flush 0.9 % injection 5-40 mL  5-40 mL IntraVENous 2 times per day Jakub James MD        sodium chloride flush 0.9 % injection 5-40 mL  5-40 mL IntraVENous PRN Jakub James MD        0.9 % sodium chloride infusion   IntraVENous PRN Jakub James MD        ceFAZolin (ANCEF) 2,000 mg in sterile water 20 mL IV syringe  2,000 mg IntraVENous On Call to [address] [place], [place] [address]MD Tomi           Allergies:  No Known Allergies    Problem List:    Patient Active Problem List   Diagnosis Code    GI bleed K92.2    Grade III hemorrhoids H89.0    Umbilical hernia B18.0       Past Medical History:        Diagnosis Date    Cancer [facility])     Oral cancer    Class 2 severe obesity due to excess calories with serious comorbidity and body mass index (BMI) of 36.0 to 36.9 in adult [facility])     Hypertension     Kidney stone     Pancreatitis     Psoriasis     Renal calculi        Past Surgical History:        Procedure Laterality Date    COLONOSCOPY N/A 2/13/2022    COLONOSCOPY DIAGNOSTIC performed by Lety Lemus MD at [address]      Due to oral cancer. Also had part or tounge removed    UPPER GASTROINTESTINAL ENDOSCOPY N/A 2/11/2022    EGD ESOPHAGOGASTRODUODENOSCOPY performed by Claudeen Serge, DO at [facility] History:    Social History     Tobacco Use    Smoking status: Every Day     Packs/day: 1.00     Types: Cigarettes    Smokeless tobacco: Former   Substance Use Topics    Alcohol use: Yes     Alcohol/week: 35.0 standard drinks     Types: 35 Shots of liquor per week     Comment: 4-6 cocktails daily                                Ready to quit: Not Answered  Counseling given: Not Answered      Vital Signs (Current):   Vitals:    12/01/22 1402 12/02/22 0645   BP:  109/72   Pulse:  (!) 102   Resp:  16   Temp:  37.3 °C (99.2 °F)   TempSrc:  Infrared   SpO2:  95%   Weight: 210 lb (95.3 kg) 212 lb (96.2 kg)   Height: 5' 7\" (1.702 m) 5' 7\" (1.702 m)                                              BP Readings from Last 3 Encounters:   12/02/22 109/72   11/21/22 111/68   09/20/22 128/78       NPO Status: Time of last liquid consumption: 2300                        Time of last solid consumption: 1200                        Date of last liquid consumption: 12/01/22                        Date of last solid food consumption: 12/01/22    BMI:   Wt Readings from Last 3 Encounters:   12/02/22 212 lb (96.2 kg)   11/21/22 215 lb (97.5 kg)   09/20/22 216 lb (98 kg)     Body mass index is 33.2 kg/m².     CBC:   Lab Results   Component Value Date/Time    WBC 13.8 02/13/2022 05:45 AM    RBC 4.06 02/13/2022 05:45 AM    HGB 8.9 02/13/2022 12:22 PM    HCT 32.4 02/13/2022 12:22 PM    MCV 74.9 02/13/2022 05:45 AM    RDW 22.6 02/13/2022 05:45 AM     02/13/2022 05:45 AM       CMP:   Lab Results   Component Value Date/Time     02/13/2022 05:45 AM    K 3.3 02/13/2022 05:45 AM    K 4.0 02/10/2022 01:38 PM     02/13/2022 05:45 AM    CO2 22 02/13/2022 05:45 AM    BUN 15 02/13/2022 05:45 AM    CREATININE 0.8 02/13/2022 05:45 AM    GFRAA >60 02/13/2022 05:45 AM    LABGLOM >60 02/13/2022 05:45 AM    GLUCOSE 125 02/13/2022 05:45 AM    PROT 7.8 02/13/2022 05:45 AM    CALCIUM 8.9 02/13/2022 05:45 AM    BILITOT 1.1 02/13/2022 05:45 AM    ALKPHOS 80 02/13/2022 05:45 AM    AST 65 02/13/2022 05:45 AM    ALT 25 02/13/2022 05:45 AM       POC Tests: No results for input(s): POCGLU, POCNA, POCK, POCCL, POCBUN, POCHEMO, POCHCT in the last 72 hours. Coags:   Lab Results   Component Value Date/Time    PROTIME 13.9 02/10/2022 04:57 PM    INR 1.2 02/10/2022 04:57 PM       HCG (If Applicable): No results found for: PREGTESTUR, PREGSERUM, HCG, HCGQUANT     ABGs: No results found for: PHART, PO2ART, UCX7LGF, IUU3XAT, BEART, O3XZSCAW     Type & Screen (If Applicable):  No results found for: LABABO, LABRH    Drug/Infectious Status (If Applicable):  No results found for: HIV, HEPCAB    COVID-19 Screening (If Applicable):   Lab Results   Component Value Date/Time    COVID19 Not-Detected 09/20/2022 01:50 PM           Anesthesia Evaluation  Patient summary reviewed and Nursing notes reviewed no history of anesthetic complications:   Airway:           Dental:          Pulmonary: breath sounds clear to auscultation                             Cardiovascular:    (+) hypertension: moderate,         Rhythm: regular  Rate: normal                    Neuro/Psych:               GI/Hepatic/Renal:             Endo/Other:                     Abdominal:             Vascular: Other Findings:           Anesthesia Plan      general         Induction: intravenous. MIPS: Postoperative opioids intended and Prophylactic antiemetics administered. Anesthetic plan and risks discussed with patient. Use of blood products discussed with patient whom consented to blood products.    Plan discussed with CRNA and attending.                     Lorraine Fleming RN   12/2/2022

## 2022-12-02 NOTE — PROCEDURES
[address] [place] [address]       [address] [facility], [address] [place] [address]              HOME SLEEP STUDY REPORT       PATIENT NAME: Gerda Arana               : 1975        MED REC NO:  [id number]     ACCOUNT NO:   [de-identified]  PROVIDER:  Micheline Russo DO  DATE OF STUDY: 2022     SERVICE PROVIDER:  Gagandeep Winston     REFERRING PROVIDER:   Melissa THAKUR    AGE: 49 y.o. SEX: male        HEIGHT: 5 ft   7 in         WEIGHT: 209 lbs          BMI: 32.9 kg/m2    NECK CIRCUMFERENCE: 17 in    Symptoms: None, witnessed apneas, nocturia, bruxism, difficulty initiating/maintaining sleep, daytime sleepiness, sleepy driving. The Taberg Sleepiness Scale was 20 out of 24 (scores above or equal to 10 are suggestive of hypersomnolence). Indication: Evaluate for obstructive sleep apnea. Medical History: Sinus problems, hypertension, hernia. Medications: Amlodipine, losartan. DESCRIPTION: Unattended, full night Home Sleep Apnea testing (HSAT) was performed using an FDA approved  WatchPAT device. Peripheral Arterial Tonometry, pulse rate, oxygen saturation, total sleep time, sleep staging, body  position, and snoring were recorded. Reported pRDI/pAHI calculations in this report are scored based on 3%  desaturations according to AASM scoring criteria. Recording started at 2:36:35 AM and ended at 12:35:47 PM. Of  the 9 hrs, 59 min of recording time, the patient was asleep for 9 hrs, 2 min. Respiratory indices were calculated using  the technically valid sleep time of 7 hrs, 46 min. REM sleep comprised 12.0% of the total sleep time. FINDINGS:  RESPIRATORY MONITORING:  The apnea/hypopnea index (pAHI) was 97.9. The respiratory disturbance index  (pRDI) was 97.9. The REM pRDI was 70.7 and the non-REM pRDI was 101.4.  The patient slept in the supine  position for 198.7 minutes (36.6% of the total sleep time), and the supine pRDI was 95.0. The patient slept in the  non-supine position for 343.0 minutes (63.2% of the total sleep time), and the non-supine pRDI was 99.4. The 3%  oxygen desaturation index (MARKEL) was 96.6. The average oxygen saturation was 92%. The lowest oxygen saturation  was 51%. Oxygen saturations were less than or equal to 88% for 55.1 minutes or 10.2% of the total oxygen  saturation evaluation period. Snoring occurred for 81.4 minutes, or 15.0% of the total valid sleep time. PULSE: The average heart rate during recording was 95 beats per minute. IMPRESSION:   1. This study is consistent with severe obstructive sleep apnea with severe oxygen desaturations. Of note, the severity of this patient's sleep disordered breathing was likely underestimated as home sleep studies are inherently less sensitive. RECOMMENDATIONS:    1. Due to the severity of the obstructive sleep apnea and hypoxemia, the patient needs an in lab CPAP titration. Will be ordered by sleep medicine provider. 2.  Per insurance requirements, the patient should be seen in clinical follow up within 3 months of receiving auto-CPAP therapy in order to document adherence to therapy, assess efficacy of the prescribed settings (as based upon a residual AHI of less than or equal to 5 on the device's remote download), and evaluate resolution of sleep-related complaints. 3.  The patient should be strongly counseled against driving while drowsy. 4.  Weight loss efforts should be encouraged, as the severity of obstructive sleep apnea may improve although no guarantee of cure can be made. 5.  The patient has been referred to the [facility] for ongoing management of obstructive sleep apnea and PAP therapy.

## 2022-12-02 NOTE — H&P
General Surgery History and Physical     Patient's Name/Date of Birth: Larisa Guardado / 1975     Date: 12/2/2022       Surgeon: Pricilla Elam M.D.     PCP: Irma Junior DO     Chief Complaint: umbilical hernia and hemorrhoids     HPI:   Larisa Guardado is a 49 y.o. male who presents for evaluation of umbilical hernia that is enlarging and becoming more painful, tolerating a diet and moving bowels. Also has bad hemorrhoids        Past Medical History        Past Medical History:   Diagnosis Date    Cancer ([address]       Oral cancer    Class 2 severe obesity due to excess calories with serious comorbidity and body mass index (BMI) of 36.0 to 36.9 in adult [facility])      Hypertension      Kidney stone      Pancreatitis      Psoriasis      Renal calculi              Past Surgical History         Past Surgical History:   Procedure Laterality Date    COLONOSCOPY N/A 2/13/2022     COLONOSCOPY DIAGNOSTIC performed by Ravinder Gutierrez MD at [address] [place] [address]         Due to oral cancer.  Also had part or tounge removed    UPPER GASTROINTESTINAL ENDOSCOPY N/A 2/11/2022     EGD ESOPHAGOGASTRODUODENOSCOPY performed by Carmella Morin DO at [facility] Facility-Administered Medications          Current Outpatient Medications   Medication Sig Dispense Refill    losartan (COZAAR) 25 MG tablet Take 25 mg by mouth daily        amLODIPine (NORVASC) 5 MG tablet Take 5 mg by mouth daily        dextromethorphan-guaiFENesin (ROBITUSSIN-DM)  MG/5ML syrup Take 10 mLs by mouth every 4 hours as needed for Cough (Patient not taking: Reported on 74/70/9616) 181 mL 0    folic acid (FOLVITE) 1 MG tablet Take 1 tablet by mouth daily (Patient not taking: No sig reported) 30 tablet 0    pantoprazole (PROTONIX) 40 MG tablet Take 1 tablet by mouth every morning (before breakfast) (Patient not taking: Reported on 11/21/2022) 30 tablet 0    vitamin B-1 (THIAMINE) 100 MG tablet Take 1 tablet by mouth daily (Patient not taking: No sig reported) 30 tablet 0    clobetasol (TEMOVATE) 0.05 % cream Apply 1 each topically 2 times daily Apply topically 2 times daily. (Patient not taking: Reported on 11/21/2022)        oxymetazoline (AFRIN) 0.05 % nasal spray 2 sprays by Nasal route 2 times daily (Patient not taking: Reported on 11/21/2022)          No current facility-administered medications for this visit. No Known Allergies     The patient has a family history that is negative for severe cardiovascular or respiratory issues, negative for reaction to anesthesia. Social History               Socioeconomic History    Marital status: Single       Spouse name: Not on file    Number of children: 2    Years of education: Not on file    Highest education level: Not on file   Occupational History    Occupation: [employer]    Tobacco Use    Smoking status: Every Day       Packs/day: 1.00       Types: Cigarettes    Smokeless tobacco: Never   Vaping Use    Vaping Use: Never used   Substance and Sexual Activity    Alcohol use:  Yes       Alcohol/week: 35.0 standard drinks       Types: 35 Shots of liquor per week       Comment: 4-6 cocktails daily    Drug use: Never    Sexual activity: Not on file   Other Topics Concern    Not on file   Social History Narrative    Not on file      Social Determinants of Health      Financial Resource Strain: Not on file   Food Insecurity: Not on file   Transportation Needs: Not on file   Physical Activity: Not on file   Stress: Not on file   Social Connections: Not on file   Intimate Partner Violence: Not on file   Housing Stability: Not on file                  Review of Systems  Review of Systems -  General ROS: negative for - chills, fatigue or malaise  ENT ROS: negative for - hearing change, nasal congestion or nasal discharge  Allergy and Immunology ROS: negative for - hives, itchy/watery eyes or nasal congestion  Hematological and Lymphatic ROS: negative for - blood clots, blood transfusions, bruising or fatigue  Endocrine ROS: negative for - malaise/lethargy, mood swings, palpitations or polydipsia/polyuria  Breast ROS: negative for - new or changing breast lumps or nipple changes  Respiratory ROS: negative for - sputum changes, stridor, tachypnea or wheezing  Cardiovascular ROS: negative for - irregular heartbeat, loss of consciousness, murmur or orthopnea  Gastrointestinal ROS: negative for - constipation, diarrhea, gas/bloating, heartburn or hematemesis  Genito-Urinary ROS: negative for -  genital discharge, genital ulcers or hematuria  Musculoskeletal ROS: negative for - gait disturbance, muscle pain or muscular weakness     Physical exam:   /68   Pulse (!) 109   Temp 97.2 °F (36.2 °C) (Temporal)   Ht 5' 7\" (1.702 m)   Wt 215 lb (97.5 kg)   BMI 33.67 kg/m²   General appearance:  NAD  Pyscho/social: negative for tremors and hallucinations  Head: NCAT, PERRLA, EOMI, red conjunctiva  Neck: supple, no masses  Lungs: CTAB, equal chest rise bilateral  Heart: Reg rate  Abdomen: soft, minimally tender around reducible umbilical hernia 1.0OZ in size, nondistended  Skin; no lesions  Rectal: hemorrhoids  Gu: no cva tenderness  Extremities: extremities normal, atraumatic, no cyanosis or edema     Assessment:  49 y.o. male with umbilical hernia and hemorrhoids     Plan: To OR for hemorrhoidectomy and then for repair of umbilical hernia  Discussed the risk, benefits and alternatives of surgery including wound infections, bleeding, scar  and the risks of  anesthetic including MI, CVA, sudden death or reactions to anesthetic medications. The patient understands the risks and alternatives. All questions were answered to the patient's satisfaction and they freely signed the consent.      Amie Quezada MD

## 2022-12-02 NOTE — DISCHARGE INSTRUCTIONS
Discharge Date:  12/2/2022    Discharged To: Home    RESUME ACTIVITY:     WOUND CARE: You may have ointment around the surgical site. This is to remain in place. It is a local numbing medication and will help with pain. Sitz baths as needed and after each bowel movement to help keep the area clean and help inflammation is recommended but not necessary. Some bleeding is normal after surgery and with bowel movements for the first few weeks. Multiple large bloody bowel movements would require return to the emergency room. This is uncommon. BATHING:  May shower 24hrs after surgery, remove dressings after 24hrs if in place. May bathe or swim 5 days after surgery    DRIVING: No driving while on pain medications    RETURN TO WORK: after follow up appointment    WALKING:  Yes    SEXUAL ACTIVITY: Yes    STAIRS:  Yes    LIFTING: Less than 25 pounds for 2 week    DIET: General adult    SPECIAL INSTRUCTIONS:     Call office if they or any other problems occur:  Fever over 101°    Redness, swelling, hardness or warmth at the operative site  Unrelieved nausea      Blood soaked dressing. (Some oozing may be normal)    Call office for follow up appointment with Dr Jorge Garza in 2 weeks. Call the office at [phone] if you have issues. BOWELS: constipation is a side effect of your pain meds, take a daily laxative (miralax, dulcolax, etc.) as needed to keep your bowels moving as they normally do, do not go 2-3 days without having a bowel movement. Pain medications;   take at least 1/2 pill every 6 hours the first 36 hours after surgery, and may take as many as 2 pills every 4 hours. After the first 36hours only take the pills as needed and stop them as soon as possible. Pain meds cause constipation so pay close attention to the \"bowels\" topic above. Keep incisions clean and dry. Okay to resume anticoagulant medication after 24hrs. Do not exceed 4000mg of Tylenol/Acetaminophen per day. Vicodin/Norco/Percocet contain acetaminophen. Do not take additional amounts of Tylenol if you are taking these medications.

## 2022-12-02 NOTE — ANESTHESIA POSTPROCEDURE EVALUATION
Department of Anesthesiology  Postprocedure Note    Patient: Caleb Norris  MRN: [id number]  YOB: 1975  Date of evaluation: 12/2/2022      Procedure Summary     Date: 12/02/22 Room / Location: [address] [place] [address] 03 / [address]    Anesthesia Start: [facility] Anesthesia Stop: 7156    Procedure: HEMORRHOIDECTOMY Diagnosis:       Grade III hemorrhoids      (Grade III hemorrhoids [K64.2])    Surgeons: Cecilia Olsen MD Responsible Provider: Jai Griffin MD    Anesthesia Type: General ASA Status: 3          Anesthesia Type: General    Pamela Phase I: Pamela Score: 10    Pamela Phase II: Pamela Score: 10      Anesthesia Post Evaluation    Patient location during evaluation: PACU  Patient participation: complete - patient participated  Level of consciousness: awake  Airway patency: patent  Nausea & Vomiting: no nausea and no vomiting  Complications: no  Cardiovascular status: hemodynamically stable  Respiratory status: acceptable  Hydration status: euvolemic

## 2022-12-07 ENCOUNTER — TELEPHONE (OUTPATIENT)
Dept: SLEEP CENTER | Age: 47
End: 2022-12-07

## 2022-12-09 DIAGNOSIS — G89.18 POST-OP PAIN: Primary | ICD-10-CM

## 2022-12-09 RX ORDER — IBUPROFEN 800 MG/1
800 TABLET ORAL EVERY 6 HOURS PRN
Qty: 20 TABLET | Refills: 0 | Status: SHIPPED | OUTPATIENT
Start: 2022-12-09

## 2022-12-09 RX ORDER — OXYCODONE HYDROCHLORIDE AND ACETAMINOPHEN 5; 325 MG/1; MG/1
1 TABLET ORAL EVERY 6 HOURS PRN
Qty: 28 TABLET | Refills: 0 | Status: SHIPPED | OUTPATIENT
Start: 2022-12-09 | End: 2022-12-16

## 2022-12-09 RX ORDER — METHOCARBAMOL 500 MG/1
500 TABLET, FILM COATED ORAL 4 TIMES DAILY
Qty: 40 TABLET | Refills: 0 | Status: SHIPPED | OUTPATIENT
Start: 2022-12-09 | End: 2022-12-19

## 2022-12-12 ENCOUNTER — OFFICE VISIT (OUTPATIENT)
Dept: SURGERY | Age: 47
End: 2022-12-12

## 2022-12-12 VITALS
SYSTOLIC BLOOD PRESSURE: 136 MMHG | WEIGHT: 212 LBS | HEIGHT: 67 IN | HEART RATE: 119 BPM | BODY MASS INDEX: 33.27 KG/M2 | RESPIRATION RATE: 18 BRPM | DIASTOLIC BLOOD PRESSURE: 85 MMHG

## 2022-12-12 DIAGNOSIS — Z09 POSTOPERATIVE EXAMINATION: Primary | ICD-10-CM

## 2022-12-12 PROCEDURE — 99024 POSTOP FOLLOW-UP VISIT: CPT | Performed by: SURGERY

## 2022-12-12 NOTE — PROGRESS NOTES
Surgery Progress Note            Chief complaint:   Patient Active Problem List   Diagnosis    GI bleed    Grade III hemorrhoids    Umbilical hernia       S: doing well    O:   Vitals:    12/12/22 1314   BP: 136/85   Pulse: (!) 119   Resp: 18     No intake or output data in the 24 hours ending 12/12/22 1326        Labs:  No results for input(s): WBC, HGB, HCT in the last 72 hours. Invalid input(s): PLR  Lab Results   Component Value Date    CREATININE 1.0 12/02/2022    BUN 7 12/02/2022     12/02/2022    K 4.0 12/02/2022     12/02/2022    CO2 25 12/02/2022     No results for input(s): LIPASE, AMYLASE in the last 72 hours. Physical exam:   /85   Pulse (!) 119   Resp 18   Ht 5' 7\" (1.702 m)   Wt 212 lb (96.2 kg)   BMI 33.20 kg/m²   General appearance: NAD  Head: NCAT  Neck: supple, no masses  Lungs: equal chest rise bilateral  Heart: S1S2 present  Abdomen: soft, nontender, nondistended  Skin; no new lesions, incisions clean and intact  Gu: no cva tenderness  Extremities: extremities normal, atraumatic, no cyanosis or edema    A:  Post op hemorrhoidectomy    P: follow up as needed.      Darlene Kerns MD, MD  12/12/2022

## 2022-12-14 ENCOUNTER — TELEPHONE (OUTPATIENT)
Dept: SLEEP CENTER | Age: 47
End: 2022-12-14

## 2022-12-21 ENCOUNTER — ANESTHESIA EVENT (OUTPATIENT)
Dept: OPERATING ROOM | Age: 47
End: 2022-12-21
Payer: COMMERCIAL

## 2022-12-21 ASSESSMENT — LIFESTYLE VARIABLES: SMOKING_STATUS: 1

## 2022-12-22 NOTE — ANESTHESIA PRE PROCEDURE
Department of Anesthesiology  Preprocedure Note       Name:  Caleb Norris   Age:  49 y.o.  :  1975                                          MRN:  [id number]         Date:  2022      Surgeon: Dat Graves):  Cecilia Olsen MD    Procedure: Procedure(s):  LAPAROSCOPIC POSSIBLE OPEN UMBILICAL HERNIA REPAIR WITH MESH    Medications prior to admission:   Prior to Admission medications    Medication Sig Start Date End Date Taking? Authorizing Provider   ibuprofen (ADVIL;MOTRIN) 800 MG tablet Take 1 tablet by mouth every 6 hours as needed for Pain 22   Cecilia Olsen MD   ibuprofen (ADVIL;MOTRIN) 800 MG tablet Take 1 tablet by mouth every 6 hours as needed for Pain 22   Cecilia Olsen MD   ondansetron [facility]) 4 MG tablet Take 1 tablet by mouth 3 times daily as needed for Nausea or Vomiting 22   Linder Skiff, MD   losartan (COZAAR) 25 MG tablet Take 25 mg by mouth daily    Historical Provider, MD   clobetasol (TEMOVATE) 0.05 % cream Apply 1 each topically as needed Apply topically 2 times daily. Historical Provider, MD   amLODIPine (NORVASC) 5 MG tablet Take 5 mg by mouth daily    Historical Provider, MD       Current medications:    Current Outpatient Medications   Medication Sig Dispense Refill    ibuprofen (ADVIL;MOTRIN) 800 MG tablet Take 1 tablet by mouth every 6 hours as needed for Pain 20 tablet 0    ibuprofen (ADVIL;MOTRIN) 800 MG tablet Take 1 tablet by mouth every 6 hours as needed for Pain 20 tablet 0    ondansetron (ZOFRAN) 4 MG tablet Take 1 tablet by mouth 3 times daily as needed for Nausea or Vomiting 15 tablet 0    losartan (COZAAR) 25 MG tablet Take 25 mg by mouth daily      clobetasol (TEMOVATE) 0.05 % cream Apply 1 each topically as needed Apply topically 2 times daily.  amLODIPine (NORVASC) 5 MG tablet Take 5 mg by mouth daily       No current facility-administered medications for this visit.        Allergies:  No Known Allergies    Problem List: Patient Active Problem List   Diagnosis Code    GI bleed K92.2    Grade III hemorrhoids J38.1    Umbilical hernia P09.5       Past Medical History:        Diagnosis Date    Cancer [facility])     Oral cancer    Class 2 severe obesity due to excess calories with serious comorbidity and body mass index (BMI) of 36.0 to 36.9 in adult [facility])     Hypertension     Kidney stone     Pancreatitis     Psoriasis     Renal calculi        Past Surgical History:        Procedure Laterality Date    COLONOSCOPY N/A 2/13/2022    COLONOSCOPY DIAGNOSTIC performed by Benna Skiff, MD at [facility] N/A 12/2/2022    HEMORRHOIDECTOMY performed by Thu Hendricks MD at [address] [place] [address]      Due to oral cancer. Also had part or tounge removed    UPPER GASTROINTESTINAL ENDOSCOPY N/A 2/11/2022    EGD ESOPHAGOGASTRODUODENOSCOPY performed by Antolin Hartman DO at [address] [place] [address] History:    Social History     Tobacco Use    Smoking status: Every Day     Packs/day: 1.00     Types: Cigarettes    Smokeless tobacco: Former   Substance Use Topics    Alcohol use: Yes     Alcohol/week: 35.0 standard drinks     Types: 35 Shots of liquor per week     Comment: 4-6 cocktails daily                                Ready to quit: Not Answered  Counseling given: Not Answered      Vital Signs (Current): There were no vitals filed for this visit.                                            BP Readings from Last 3 Encounters:   12/12/22 136/85   12/02/22 119/63   11/21/22 111/68       NPO Status:                                                                                 BMI:   Wt Readings from Last 3 Encounters:   12/12/22 212 lb (96.2 kg)   12/02/22 212 lb (96.2 kg)   11/21/22 215 lb (97.5 kg)     There is no height or weight on file to calculate BMI.    CBC:   Lab Results   Component Value Date/Time    WBC 9.1 12/02/2022 06:55 AM    RBC 4.54 12/02/2022 06:55 AM    HGB 9.8 12/02/2022 06:55 AM    HCT 34.0 12/02/2022 06:55 AM    MCV 74.9 12/02/2022 06:55 AM    RDW 19.8 12/02/2022 06:55 AM     12/02/2022 06:55 AM       CMP:   Lab Results   Component Value Date/Time     12/02/2022 06:55 AM    K 4.0 12/02/2022 06:55 AM    K 4.0 02/10/2022 01:38 PM     12/02/2022 06:55 AM    CO2 25 12/02/2022 06:55 AM    BUN 7 12/02/2022 06:55 AM    CREATININE 1.0 12/02/2022 06:55 AM    GFRAA >60 02/13/2022 05:45 AM    LABGLOM >60 12/02/2022 06:55 AM    GLUCOSE 170 12/02/2022 06:55 AM    PROT 7.8 02/13/2022 05:45 AM    CALCIUM 9.2 12/02/2022 06:55 AM    BILITOT 1.1 02/13/2022 05:45 AM    ALKPHOS 80 02/13/2022 05:45 AM    AST 65 02/13/2022 05:45 AM    ALT 25 02/13/2022 05:45 AM       POC Tests: No results for input(s): POCGLU, POCNA, POCK, POCCL, POCBUN, POCHEMO, POCHCT in the last 72 hours. Coags:   Lab Results   Component Value Date/Time    PROTIME 13.9 02/10/2022 04:57 PM    INR 1.2 02/10/2022 04:57 PM       HCG (If Applicable): No results found for: PREGTESTUR, PREGSERUM, HCG, HCGQUANT     ABGs: No results found for: PHART, PO2ART, HUT0PVH, CTX4SDC, BEART, L6QEEBCL     Type & Screen (If Applicable):  No results found for: LABABO, LABRH    Drug/Infectious Status (If Applicable):  No results found for: HIV, HEPCAB    COVID-19 Screening (If Applicable):   Lab Results   Component Value Date/Time    COVID19 Not-Detected 09/20/2022 01:50 PM           Anesthesia Evaluation  Patient summary reviewed no history of anesthetic complications:   Airway: Mallampati: III  TM distance: >3 FB   Neck ROM: full  Mouth opening: > = 3 FB   Dental:      Comment: Dentition intact, multiple incisor and molar crowns, denies any loose teeth. Pulmonary: breath sounds clear to auscultation  (+) COPD:  decreased breath sounds current smoker ( 1-1.5 PPD x 28 years)    Sleep apnea:  1.0 PPD x 30 years. Patient smoked on day of surgery.                  Cardiovascular:    (+) hypertension:, hyperlipidemia        Rhythm: regular  Rate: normal           Beta Blocker:  Not on Beta Blocker         Neuro/Psych:   (+) psychiatric history:             ROS comment: ALCOHOL ABUSE  GI/Hepatic/Renal:   (+) GERD:,          ROS comment: GI BLEED  HISTORY OF PANCREATITIS . Endo/Other:    (+) malignancy/cancer (ORAL). ROS comment: ETOH use  Abdominal:   (+) obese,           Vascular: negative vascular ROS. Other Findings:             Anesthesia Plan      general     ASA 3       Induction: intravenous. MIPS: Postoperative opioids intended and Prophylactic antiemetics administered. Anesthetic plan and risks discussed with patient. Plan discussed with CRNA. DOS STAFF ADDENDUM:    Pt seen and examined, chart reviewed (including anesthesia, drug and allergy history). Anesthetic plan, risks, benefits, alternatives, and personnel involved discussed with patient. Patient verbalized an understanding and agrees to proceed. Plan discussed with care team members and agreed upon.     Kimberley Mitchell MD  Staff Anesthesiologist      Kimberley Mitchell MD   [id number]    MARY Christine - CRNA

## 2022-12-22 NOTE — ANESTHESIA PRE PROCEDURE
Department of Anesthesiology  Preprocedure Note       Name:  Marshall Lerma   Age:  49 y.o.  :  1975                                          MRN:  [id number]         Date:  2022      Surgeon: Carmen Wade):  Leticia Cordoba MD    Procedure: Procedure(s):  LAPAROSCOPIC POSSIBLE OPEN UMBILICAL HERNIA REPAIR WITH MESH    Medications prior to admission:   Prior to Admission medications    Medication Sig Start Date End Date Taking? Authorizing Provider   ibuprofen (ADVIL;MOTRIN) 800 MG tablet Take 1 tablet by mouth every 6 hours as needed for Pain 22   Leticia Cordoba MD   ibuprofen (ADVIL;MOTRIN) 800 MG tablet Take 1 tablet by mouth every 6 hours as needed for Pain 22   Leticia Cordoba MD   ondansetron [facility]) 4 MG tablet Take 1 tablet by mouth 3 times daily as needed for Nausea or Vomiting 22   Sepideh Patterson MD   losartan (COZAAR) 25 MG tablet Take 25 mg by mouth daily    Historical Provider, MD   clobetasol (TEMOVATE) 0.05 % cream Apply 1 each topically as needed Apply topically 2 times daily. Historical Provider, MD   amLODIPine (NORVASC) 5 MG tablet Take 5 mg by mouth daily    Historical Provider, MD       Current medications:    Current Outpatient Medications   Medication Sig Dispense Refill    ibuprofen (ADVIL;MOTRIN) 800 MG tablet Take 1 tablet by mouth every 6 hours as needed for Pain 20 tablet 0    ibuprofen (ADVIL;MOTRIN) 800 MG tablet Take 1 tablet by mouth every 6 hours as needed for Pain 20 tablet 0    ondansetron (ZOFRAN) 4 MG tablet Take 1 tablet by mouth 3 times daily as needed for Nausea or Vomiting 15 tablet 0    losartan (COZAAR) 25 MG tablet Take 25 mg by mouth daily      clobetasol (TEMOVATE) 0.05 % cream Apply 1 each topically as needed Apply topically 2 times daily.  amLODIPine (NORVASC) 5 MG tablet Take 5 mg by mouth daily       No current facility-administered medications for this visit.        Allergies:  No Known Allergies    Problem List: Patient Active Problem List   Diagnosis Code    GI bleed K92.2    Grade III hemorrhoids D06.8    Umbilical hernia A11.6       Past Medical History:        Diagnosis Date    Cancer [facility])     Oral cancer    Class 2 severe obesity due to excess calories with serious comorbidity and body mass index (BMI) of 36.0 to 36.9 in adult [facility])     Hypertension     Kidney stone     Pancreatitis     Psoriasis     Renal calculi        Past Surgical History:        Procedure Laterality Date    COLONOSCOPY N/A 2/13/2022    COLONOSCOPY DIAGNOST[facility] by Lissa Steinberg MD at [facility] N/A 12/2/2022    HEMORRHOIDECTOMY performed by Shahram Shanks MD at [facility]      Due to oral cancer. Also had part or tounge removed    UPPER GASTROINTESTINAL ENDOSCOPY N/A 2/11/2022    EGD ESOPHAGOGASTRODUODENOSCOPY performed by David Brooks DO at [address] History:    Social History     Tobacco Use    Smoking status: Every Day     Packs/day: 1.00     Types: Cigarettes    Smokeless tobacco: Former   Substance Use Topics    Alcohol use: Yes     Alcohol/week: 35.0 standard drinks     Types: 35 Shots of liquor per week     Comment: 4-6 cocktails daily                                Ready to quit: Not Answered  Counseling given: Not Answered      Vital Signs (Current): There were no vitals filed for this visit.                                            BP Readings from Last 3 Encounters:   12/12/22 136/85   12/02/22 119/63   11/21/22 111/68       NPO Status:                                                                                 BMI:   Wt Readings from Last 3 Encounters:   12/12/22 212 lb (96.2 kg)   12/02/22 212 lb (96.2 kg)   11/21/22 215 lb (97.5 kg)     There is no height or weight on file to calculate BMI.    CBC:   Lab Results   Component Value Date/Time    WBC 9.1 12/02/2022 06:55 AM    RBC 4.54 12/02/2022 06:55 AM    HGB 9.8 12/02/2022 06:55 AM    HCT 34.0 12/02/2022 06:55 AM    MCV 74.9 12/02/2022 06:55 AM    RDW 19.8 12/02/2022 06:55 AM     12/02/2022 06:55 AM       CMP:   Lab Results   Component Value Date/Time     12/02/2022 06:55 AM    K 4.0 12/02/2022 06:55 AM    K 4.0 02/10/2022 01:38 PM     12/02/2022 06:55 AM    CO2 25 12/02/2022 06:55 AM    BUN 7 12/02/2022 06:55 AM    CREATININE 1.0 12/02/2022 06:55 AM    GFRAA >60 02/13/2022 05:45 AM    LABGLOM >60 12/02/2022 06:55 AM    GLUCOSE 170 12/02/2022 06:55 AM    PROT 7.8 02/13/2022 05:45 AM    CALCIUM 9.2 12/02/2022 06:55 AM    BILITOT 1.1 02/13/2022 05:45 AM    ALKPHOS 80 02/13/2022 05:45 AM    AST 65 02/13/2022 05:45 AM    ALT 25 02/13/2022 05:45 AM       POC Tests: No results for input(s): POCGLU, POCNA, POCK, POCCL, POCBUN, POCHEMO, POCHCT in the last 72 hours. Coags:   Lab Results   Component Value Date/Time    PROTIME 13.9 02/10/2022 04:57 PM    INR 1.2 02/10/2022 04:57 PM       HCG (If Applicable): No results found for: PREGTESTUR, PREGSERUM, HCG, HCGQUANT     ABGs: No results found for: PHART, PO2ART, ELH6AWA, KEL8LHH, BEART, G6FIPNPY     Type & Screen (If Applicable):  No results found for: LABABO, LABRH    Drug/Infectious Status (If Applicable):  No results found for: HIV, HEPCAB    COVID-19 Screening (If Applicable):   Lab Results   Component Value Date/Time    COVID19 Not-Detected 09/20/2022 01:50 PM           Anesthesia Evaluation  Patient summary reviewed no history of anesthetic complications:   Airway: Mallampati: III  TM distance: >3 FB   Neck ROM: full  Mouth opening: > = 3 FB   Dental:          Pulmonary: breath sounds clear to auscultation  (+) COPD:  current smoker    Sleep apnea:  1.0 PPD x 30 years.                            Cardiovascular:    (+) hypertension:, hyperlipidemia        Rhythm: regular             Beta Blocker:  Not on Beta Blocker         Neuro/Psych:   (+) psychiatric history:             ROS comment: ALCOHOL ABUSE GI/Hepatic/Renal:   (+) GERD:,          ROS comment: GI BLEED  HISTORY OF PANCREATITIS . Endo/Other:    (+) malignancy/cancer (ORAL). ROS comment: ETOH use  Abdominal:   (+) obese,           Vascular: negative vascular ROS. Other Findings:             Anesthesia Plan      general     ASA 3       Induction: intravenous. MIPS: Prophylactic antiemetics administered. Anesthetic plan and risks discussed with patient. Plan discussed with CRNA. DOS STAFF ADDENDUM:    Pt seen and examined, chart reviewed (including anesthesia, drug and allergy history). Anesthetic plan, risks, benefits, alternatives, and personnel involved discussed with patient. Patient verbalized an understanding and agrees to proceed. Plan discussed with care team members and agreed upon.     Elizabeth Newman MD  Staff Anesthesiologist  8:57 PM    Elizabeth Newman MD   12/21/2022

## 2022-12-22 NOTE — PROGRESS NOTES
[address] [facility],[address]                                                                                                                    PRE OP INSTRUCTIONS FOR  Nato Ross        Date: 12/22/2022    Date of surgery: 12/23/2022   Arrival Time: Hospital will call you between 5pm and 7pm with your final arrival time for surgery    Do not eat or drink anything after 12 am prior to surgery. This includes no water, chewing gum, mints or ice chips. Take the following medications with a small sip of water on the morning of Surgery: norvasc     Diabetics may take evening dose of insulin but none after midnight. If you feel symptomatic or low blood sugar morning of surgery drink 1-2 ounces of apple juice only. Aspirin, Ibuprofen, Advil, Naproxen, Vitamin E and other Anti-inflammatory products should be stopped  before surgery  as directed by your physician. Take Tylenol only unless instructed otherwise by your surgeon. Check with your Doctor regarding stopping Plavix, Coumadin, Lovenox, Eliquis, Effient, or other blood thinners. Do not smoke,use illicit drugs and do not drink any alcoholic beverages 24 hours prior to surgery. You may brush your teeth the morning of surgery. DO NOT SWALLOW WATER    You MUST make arrangements for a responsible adult to take you home after your surgery. You will not be allowed to leave alone or drive yourself home. It is strongly suggested someone stay with you the first 24 hrs. Your surgery will be cancelled if you do not have a ride home. PEDIATRIC PATIENTS ONLY:  A parent/legal guardian must accompany a child scheduled for surgery and plan to stay at the hospital until the child is discharged. Please do not bring other children with you.     Please wear simple, loose fitting clothing to the hospital.  Erasto Murdock not bring valuables (money, credit cards, checkbooks, etc.) Do not wear any makeup (including no eye makeup) or nail polish on your fingers or toes. DO NOT wear any jewelry or piercings on day of surgery. All body piercing jewelry must be removed. Shower the night before surgery with ___Antibacterial soap /SRI WIPES________    TOTAL JOINT REPLACEMENT/HYSTERECTOMY PATIENTS ONLY---Remember to bring Blood Bank bracelet to the hospital on the day of surgery. If you have a Living Will and Durable Power of  for Healthcare, please bring in a copy. If appropriate bring crutches, inspirex, WALKER, CANE etc... Notify your Surgeon if you develop any illness between now and surgery time, cough, cold, fever, sore throat, nausea, vomiting, etc.  Please notify your surgeon if you experience dizziness, shortness of breath or blurred vision between now & the time of your surgery. If you have ___dentures, they will be removed before going to the OR; we will provide you a container. If you wear ___contact lenses or ___glasses, they will be removed; please bring a case for them. To provide excellent care visitors will be limited to 2 in the room at any given time. Please bring picture ID and insurance card. Sleep apnea patients need to bring CPAP AND SETTINGS to hospital on day of surgery. During flu season no children under the age of 15 are permitted in the hospital for the safety of all patients. Other                  Please call AMBULATORY CARE if you have any further questions.    [address] [facility]     [address]

## 2022-12-23 ENCOUNTER — HOSPITAL ENCOUNTER (OUTPATIENT)
Age: 47
Setting detail: OUTPATIENT SURGERY
Discharge: HOME | End: 2022-12-23
Attending: SURGERY | Admitting: SURGERY
Payer: COMMERCIAL

## 2022-12-23 ENCOUNTER — ANESTHESIA (OUTPATIENT)
Dept: OPERATING ROOM | Age: 47
End: 2022-12-23
Payer: COMMERCIAL

## 2022-12-23 VITALS
SYSTOLIC BLOOD PRESSURE: 140 MMHG | HEART RATE: 68 BPM | TEMPERATURE: 97 F | BODY MASS INDEX: 32.18 KG/M2 | HEIGHT: 67 IN | OXYGEN SATURATION: 100 % | RESPIRATION RATE: 16 BRPM | DIASTOLIC BLOOD PRESSURE: 72 MMHG | WEIGHT: 205 LBS

## 2022-12-23 DIAGNOSIS — K42.9 UMBILICAL HERNIA: ICD-10-CM

## 2022-12-23 LAB
HCT VFR BLD CALC: 32.5 % (ref 37–54)
HEMOGLOBIN: 9.4 G/DL (ref 12.5–16.5)
MCH RBC QN AUTO: 21.2 PG (ref 26–35)
MCHC RBC AUTO-ENTMCNC: 28.9 % (ref 32–34.5)
MCV RBC AUTO: 73.4 FL (ref 80–99.9)
PDW BLD-RTO: 19.3 FL (ref 11.5–15)
PLATELET # BLD: 446 E9/L (ref 130–450)
PMV BLD AUTO: 9.7 FL (ref 7–12)
RBC # BLD: 4.43 E12/L (ref 3.8–5.8)
WBC # BLD: 11 E9/L (ref 4.5–11.5)

## 2022-12-23 PROCEDURE — 36415 COLL VENOUS BLD VENIPUNCTURE: CPT

## 2022-12-23 PROCEDURE — 99024 POSTOP FOLLOW-UP VISIT: CPT | Performed by: SURGERY

## 2022-12-23 PROCEDURE — 7100000010 HC PHASE II RECOVERY - FIRST 15 MIN: Performed by: SURGERY

## 2022-12-23 PROCEDURE — 7100000000 HC PACU RECOVERY - FIRST 15 MIN: Performed by: SURGERY

## 2022-12-23 PROCEDURE — 2709999900 HC NON-CHARGEABLE SUPPLY: Performed by: SURGERY

## 2022-12-23 PROCEDURE — 3600000012 HC SURGERY LEVEL 2 ADDTL 15MIN: Performed by: SURGERY

## 2022-12-23 PROCEDURE — 2580000003 HC RX 258

## 2022-12-23 PROCEDURE — 3700000000 HC ANESTHESIA ATTENDED CARE: Performed by: SURGERY

## 2022-12-23 PROCEDURE — 6370000000 HC RX 637 (ALT 250 FOR IP): Performed by: ANESTHESIOLOGY

## 2022-12-23 PROCEDURE — 6370000000 HC RX 637 (ALT 250 FOR IP)

## 2022-12-23 PROCEDURE — C1781 MESH (IMPLANTABLE): HCPCS | Performed by: SURGERY

## 2022-12-23 PROCEDURE — 2580000003 HC RX 258: Performed by: SURGERY

## 2022-12-23 PROCEDURE — 2500000003 HC RX 250 WO HCPCS

## 2022-12-23 PROCEDURE — 85027 COMPLETE CBC AUTOMATED: CPT

## 2022-12-23 PROCEDURE — 6360000002 HC RX W HCPCS: Performed by: SURGERY

## 2022-12-23 PROCEDURE — 7100000011 HC PHASE II RECOVERY - ADDTL 15 MIN: Performed by: SURGERY

## 2022-12-23 PROCEDURE — 7100000001 HC PACU RECOVERY - ADDTL 15 MIN: Performed by: SURGERY

## 2022-12-23 PROCEDURE — 6360000002 HC RX W HCPCS

## 2022-12-23 PROCEDURE — 2500000003 HC RX 250 WO HCPCS: Performed by: SURGERY

## 2022-12-23 PROCEDURE — 3600000002 HC SURGERY LEVEL 2 BASE: Performed by: SURGERY

## 2022-12-23 PROCEDURE — 49587: CPT | Performed by: SURGERY

## 2022-12-23 PROCEDURE — 3700000001 HC ADD 15 MINUTES (ANESTHESIA): Performed by: SURGERY

## 2022-12-23 DEVICE — BARD VENTRALEX HERNIA PATCH, 6.4 CM (2.5"), MEDIUM CIRCLE WITH STRAP
Type: IMPLANTABLE DEVICE | Site: UMBILICAL | Status: FUNCTIONAL
Brand: VENTRALEX

## 2022-12-23 RX ORDER — ROCURONIUM BROMIDE 10 MG/ML
INJECTION, SOLUTION INTRAVENOUS PRN
Status: DISCONTINUED | OUTPATIENT
Start: 2022-12-23 | End: 2022-12-23 | Stop reason: SDUPTHER

## 2022-12-23 RX ORDER — KETOROLAC TROMETHAMINE 30 MG/ML
INJECTION, SOLUTION INTRAMUSCULAR; INTRAVENOUS
Status: COMPLETED
Start: 2022-12-23 | End: 2022-12-23

## 2022-12-23 RX ORDER — HYDRALAZINE HYDROCHLORIDE 20 MG/ML
10 INJECTION INTRAMUSCULAR; INTRAVENOUS
Status: DISCONTINUED | OUTPATIENT
Start: 2022-12-23 | End: 2022-12-23 | Stop reason: HOSPADM

## 2022-12-23 RX ORDER — LABETALOL HYDROCHLORIDE 5 MG/ML
10 INJECTION, SOLUTION INTRAVENOUS
Status: DISCONTINUED | OUTPATIENT
Start: 2022-12-23 | End: 2022-12-23 | Stop reason: HOSPADM

## 2022-12-23 RX ORDER — IPRATROPIUM BROMIDE AND ALBUTEROL SULFATE 2.5; .5 MG/3ML; MG/3ML
SOLUTION RESPIRATORY (INHALATION)
Status: COMPLETED
Start: 2022-12-23 | End: 2022-12-23

## 2022-12-23 RX ORDER — HALOPERIDOL 5 MG/ML
1 INJECTION INTRAMUSCULAR
Status: DISCONTINUED | OUTPATIENT
Start: 2022-12-23 | End: 2022-12-23 | Stop reason: HOSPADM

## 2022-12-23 RX ORDER — SODIUM CHLORIDE 0.9 % (FLUSH) 0.9 %
5-40 SYRINGE (ML) INJECTION EVERY 12 HOURS SCHEDULED
Status: DISCONTINUED | OUTPATIENT
Start: 2022-12-23 | End: 2022-12-23 | Stop reason: HOSPADM

## 2022-12-23 RX ORDER — OXYCODONE HYDROCHLORIDE 5 MG/1
5 TABLET ORAL
Status: COMPLETED | OUTPATIENT
Start: 2022-12-23 | End: 2022-12-23

## 2022-12-23 RX ORDER — SODIUM CHLORIDE 0.9 % (FLUSH) 0.9 %
5-40 SYRINGE (ML) INJECTION PRN
Status: DISCONTINUED | OUTPATIENT
Start: 2022-12-23 | End: 2022-12-23 | Stop reason: HOSPADM

## 2022-12-23 RX ORDER — SODIUM CHLORIDE 9 MG/ML
INJECTION, SOLUTION INTRAVENOUS CONTINUOUS PRN
Status: DISCONTINUED | OUTPATIENT
Start: 2022-12-23 | End: 2022-12-23 | Stop reason: SDUPTHER

## 2022-12-23 RX ORDER — ONDANSETRON 2 MG/ML
INJECTION INTRAMUSCULAR; INTRAVENOUS PRN
Status: DISCONTINUED | OUTPATIENT
Start: 2022-12-23 | End: 2022-12-23 | Stop reason: SDUPTHER

## 2022-12-23 RX ORDER — KETOROLAC TROMETHAMINE 30 MG/ML
30 INJECTION, SOLUTION INTRAMUSCULAR; INTRAVENOUS ONCE
Status: COMPLETED | OUTPATIENT
Start: 2022-12-23 | End: 2022-12-23

## 2022-12-23 RX ORDER — SODIUM CHLORIDE 9 MG/ML
INJECTION, SOLUTION INTRAVENOUS PRN
Status: DISCONTINUED | OUTPATIENT
Start: 2022-12-23 | End: 2022-12-23 | Stop reason: HOSPADM

## 2022-12-23 RX ORDER — BUPIVACAINE HYDROCHLORIDE AND EPINEPHRINE 2.5; 5 MG/ML; UG/ML
INJECTION, SOLUTION EPIDURAL; INFILTRATION; INTRACAUDAL; PERINEURAL PRN
Status: DISCONTINUED | OUTPATIENT
Start: 2022-12-23 | End: 2022-12-23 | Stop reason: ALTCHOICE

## 2022-12-23 RX ORDER — MIDAZOLAM HYDROCHLORIDE 1 MG/ML
INJECTION INTRAMUSCULAR; INTRAVENOUS PRN
Status: DISCONTINUED | OUTPATIENT
Start: 2022-12-23 | End: 2022-12-23 | Stop reason: SDUPTHER

## 2022-12-23 RX ORDER — DIPHENHYDRAMINE HYDROCHLORIDE 50 MG/ML
12.5 INJECTION INTRAMUSCULAR; INTRAVENOUS
Status: DISCONTINUED | OUTPATIENT
Start: 2022-12-23 | End: 2022-12-23 | Stop reason: HOSPADM

## 2022-12-23 RX ORDER — PROCHLORPERAZINE EDISYLATE 5 MG/ML
5 INJECTION INTRAMUSCULAR; INTRAVENOUS
Status: DISCONTINUED | OUTPATIENT
Start: 2022-12-23 | End: 2022-12-23 | Stop reason: HOSPADM

## 2022-12-23 RX ORDER — PROPOFOL 10 MG/ML
INJECTION, EMULSION INTRAVENOUS PRN
Status: DISCONTINUED | OUTPATIENT
Start: 2022-12-23 | End: 2022-12-23 | Stop reason: SDUPTHER

## 2022-12-23 RX ORDER — METHOCARBAMOL 100 MG/ML
1000 INJECTION, SOLUTION INTRAMUSCULAR; INTRAVENOUS ONCE
Status: COMPLETED | OUTPATIENT
Start: 2022-12-23 | End: 2022-12-23

## 2022-12-23 RX ORDER — FENTANYL CITRATE 50 UG/ML
INJECTION, SOLUTION INTRAMUSCULAR; INTRAVENOUS PRN
Status: DISCONTINUED | OUTPATIENT
Start: 2022-12-23 | End: 2022-12-23 | Stop reason: SDUPTHER

## 2022-12-23 RX ORDER — OXYCODONE HYDROCHLORIDE AND ACETAMINOPHEN 5; 325 MG/1; MG/1
1 TABLET ORAL EVERY 6 HOURS PRN
Qty: 15 TABLET | Refills: 0 | Status: SHIPPED | OUTPATIENT
Start: 2022-12-23 | End: 2022-12-30

## 2022-12-23 RX ORDER — SODIUM CHLORIDE, SODIUM LACTATE, POTASSIUM CHLORIDE, CALCIUM CHLORIDE 600; 310; 30; 20 MG/100ML; MG/100ML; MG/100ML; MG/100ML
INJECTION, SOLUTION INTRAVENOUS CONTINUOUS
Status: DISCONTINUED | OUTPATIENT
Start: 2022-12-23 | End: 2022-12-23 | Stop reason: HOSPADM

## 2022-12-23 RX ORDER — DEXAMETHASONE SODIUM PHOSPHATE 4 MG/ML
INJECTION, SOLUTION INTRA-ARTICULAR; INTRALESIONAL; INTRAMUSCULAR; INTRAVENOUS; SOFT TISSUE PRN
Status: DISCONTINUED | OUTPATIENT
Start: 2022-12-23 | End: 2022-12-23 | Stop reason: SDUPTHER

## 2022-12-23 RX ORDER — IPRATROPIUM BROMIDE AND ALBUTEROL SULFATE 2.5; .5 MG/3ML; MG/3ML
1 SOLUTION RESPIRATORY (INHALATION)
Status: COMPLETED | OUTPATIENT
Start: 2022-12-23 | End: 2022-12-23

## 2022-12-23 RX ORDER — MIDAZOLAM HYDROCHLORIDE 1 MG/ML
2 INJECTION INTRAMUSCULAR; INTRAVENOUS
Status: DISCONTINUED | OUTPATIENT
Start: 2022-12-23 | End: 2022-12-23 | Stop reason: HOSPADM

## 2022-12-23 RX ORDER — MEPERIDINE HYDROCHLORIDE 25 MG/ML
12.5 INJECTION INTRAMUSCULAR; INTRAVENOUS; SUBCUTANEOUS EVERY 5 MIN PRN
Status: DISCONTINUED | OUTPATIENT
Start: 2022-12-23 | End: 2022-12-23 | Stop reason: HOSPADM

## 2022-12-23 RX ORDER — METHOCARBAMOL 100 MG/ML
INJECTION, SOLUTION INTRAMUSCULAR; INTRAVENOUS
Status: COMPLETED
Start: 2022-12-23 | End: 2022-12-23

## 2022-12-23 RX ORDER — LIDOCAINE HYDROCHLORIDE 20 MG/ML
INJECTION, SOLUTION INTRAVENOUS PRN
Status: DISCONTINUED | OUTPATIENT
Start: 2022-12-23 | End: 2022-12-23 | Stop reason: SDUPTHER

## 2022-12-23 RX ADMIN — IPRATROPIUM BROMIDE AND ALBUTEROL SULFATE 1 AMPULE: .5; 2.5 SOLUTION RESPIRATORY (INHALATION) at 08:11

## 2022-12-23 RX ADMIN — ROCURONIUM BROMIDE 30 MG: 10 INJECTION, SOLUTION INTRAVENOUS at 07:00

## 2022-12-23 RX ADMIN — Medication 0.5 MG: at 08:10

## 2022-12-23 RX ADMIN — DEXAMETHASONE SODIUM PHOSPHATE 10 MG: 4 INJECTION, SOLUTION INTRAMUSCULAR; INTRAVENOUS at 07:21

## 2022-12-23 RX ADMIN — SUGAMMADEX 300 MG: 100 INJECTION, SOLUTION INTRAVENOUS at 07:25

## 2022-12-23 RX ADMIN — LIDOCAINE HYDROCHLORIDE 100 MG: 20 INJECTION, SOLUTION INTRAVENOUS at 07:00

## 2022-12-23 RX ADMIN — METHOCARBAMOL 1000 MG: 1000 INJECTION, SOLUTION INTRAMUSCULAR; INTRAVENOUS at 08:10

## 2022-12-23 RX ADMIN — SODIUM CHLORIDE: 900 INJECTION, SOLUTION INTRAVENOUS at 07:01

## 2022-12-23 RX ADMIN — MIDAZOLAM 2 MG: 1 INJECTION INTRAMUSCULAR; INTRAVENOUS at 06:57

## 2022-12-23 RX ADMIN — HYDROMORPHONE HYDROCHLORIDE 0.5 MG: 1 INJECTION, SOLUTION INTRAMUSCULAR; INTRAVENOUS; SUBCUTANEOUS at 08:00

## 2022-12-23 RX ADMIN — KETOROLAC TROMETHAMINE 30 MG: 30 INJECTION, SOLUTION INTRAMUSCULAR; INTRAVENOUS at 08:00

## 2022-12-23 RX ADMIN — IPRATROPIUM BROMIDE AND ALBUTEROL SULFATE 1 AMPULE: 2.5; .5 SOLUTION RESPIRATORY (INHALATION) at 08:11

## 2022-12-23 RX ADMIN — FENTANYL CITRATE 100 MCG: 50 INJECTION, SOLUTION INTRAMUSCULAR; INTRAVENOUS at 06:57

## 2022-12-23 RX ADMIN — Medication 0.5 MG: at 08:00

## 2022-12-23 RX ADMIN — METHOCARBAMOL 1000 MG: 100 INJECTION, SOLUTION INTRAMUSCULAR; INTRAVENOUS at 08:10

## 2022-12-23 RX ADMIN — ONDANSETRON 2 MG: 2 INJECTION INTRAMUSCULAR; INTRAVENOUS at 07:21

## 2022-12-23 RX ADMIN — SODIUM CHLORIDE, POTASSIUM CHLORIDE, SODIUM LACTATE AND CALCIUM CHLORIDE: 600; 310; 30; 20 INJECTION, SOLUTION INTRAVENOUS at 06:35

## 2022-12-23 RX ADMIN — OXYCODONE 5 MG: 5 TABLET ORAL at 09:32

## 2022-12-23 RX ADMIN — HYDROMORPHONE HYDROCHLORIDE 0.5 MG: 1 INJECTION, SOLUTION INTRAMUSCULAR; INTRAVENOUS; SUBCUTANEOUS at 08:10

## 2022-12-23 RX ADMIN — CEFAZOLIN 2000 MG: 2 INJECTION, POWDER, FOR SOLUTION INTRAMUSCULAR; INTRAVENOUS at 07:06

## 2022-12-23 RX ADMIN — PROPOFOL 200 MG: 10 INJECTION, EMULSION INTRAVENOUS at 07:00

## 2022-12-23 ASSESSMENT — PAIN DESCRIPTION - FREQUENCY
FREQUENCY: CONTINUOUS
FREQUENCY: INTERMITTENT

## 2022-12-23 ASSESSMENT — PAIN DESCRIPTION - DESCRIPTORS
DESCRIPTORS: PATIENT UNABLE TO DESCRIBE
DESCRIPTORS: SHARP
DESCRIPTORS: DISCOMFORT

## 2022-12-23 ASSESSMENT — PAIN DESCRIPTION - ONSET
ONSET: ON-GOING
ONSET: ON-GOING

## 2022-12-23 ASSESSMENT — PAIN DESCRIPTION - LOCATION
LOCATION: ABDOMEN
LOCATION: ANKLE

## 2022-12-23 ASSESSMENT — PAIN DESCRIPTION - ORIENTATION
ORIENTATION: ANTERIOR
ORIENTATION: MID
ORIENTATION: MID
ORIENTATION: ANTERIOR;MID
ORIENTATION: ANTERIOR
ORIENTATION: MID

## 2022-12-23 ASSESSMENT — PAIN DESCRIPTION - PAIN TYPE
TYPE: SURGICAL PAIN
TYPE: SURGICAL PAIN

## 2022-12-23 ASSESSMENT — PAIN SCALES - GENERAL
PAINLEVEL_OUTOF10: 6
PAINLEVEL_OUTOF10: 8
PAINLEVEL_OUTOF10: 8
PAINLEVEL_OUTOF10: 6
PAINLEVEL_OUTOF10: 8
PAINLEVEL_OUTOF10: 8

## 2022-12-23 ASSESSMENT — PAIN - FUNCTIONAL ASSESSMENT
PAIN_FUNCTIONAL_ASSESSMENT: 0-10
PAIN_FUNCTIONAL_ASSESSMENT: ACTIVITIES ARE NOT PREVENTED
PAIN_FUNCTIONAL_ASSESSMENT: ACTIVITIES ARE NOT PREVENTED

## 2022-12-23 ASSESSMENT — LIFESTYLE VARIABLES: SMOKING_STATUS: 1

## 2022-12-23 NOTE — ANESTHESIA POSTPROCEDURE EVALUATION
Department of Anesthesiology  Postprocedure Note    Patient: Obey Mares  MRN: [id number]  YOB: 1975  Date of evaluation: 12/23/2022      Procedure Summary     Date: 12/23/22 Room / Location: [address] [place] [address] 03 / [facility]    Anesthesia Start: 3682 Anesthesia Stop: 9219    Procedure: OPEN UMBILICAL HERNIA REPAIR WITH MESH (Abdomen) Diagnosis:       Umbilical hernia      (Umbilical hernia [E84.7])    Surgeons: Emmanuel Santiago MD Responsible Provider: Andressa Woodall MD    Anesthesia Type: general ASA Status: 3          Anesthesia Type: No value filed.     Pamela Phase I: Pamela Score: 9    Pamela Phase II:        Anesthesia Post Evaluation    Patient location during evaluation: PACU  Patient participation: complete - patient participated  Level of consciousness: awake  Pain score: 4  Airway patency: patent  Nausea & Vomiting: no nausea and no vomiting  Complications: no  Cardiovascular status: hemodynamically stable  Respiratory status: acceptable  Hydration status: euvolemic

## 2022-12-23 NOTE — DISCHARGE INSTRUCTIONS
Patient Discharge Instructions  Discharge Date:  12/23/2022    Discharged To: Home    RESUME ACTIVITY:      BATHING:  May shower 24hrs after surgery, may bathe 3 days after surgery    DRIVING: No driving while on pain medications    RETURN TO WORK: after follow up appointment    WALKING:  Yes    SEXUAL ACTIVITY: Yes    STAIRS:  Yes    LIFTING: Less than 25 lbs for 2weeks then no more than 50lbs for the next two, then no limitations    DIET: common adult    BOWELS: constipation is a side effect of your pain meds, take a daily laxative (miralax, dulcolax, etc.) as needed to keep your bowels moving as they normally do, do not go 2-3 days without having a bowel movement. SPECIAL INSTRUCTIONS:     Call the office at [phone] if you have a fever > 100 F, or if your incision becomes red, tender, or drains more than a small amount of clear fluid.     Call  for follow up appointment with Dr. Webb Early in:  2weeks

## 2022-12-23 NOTE — H&P
General Surgery History and Physical    Patient's Name/Date of Birth: Pratibha Bernard / 1975    Date: 2022     Surgeon: Karyn Mckeon M.D.    PCP: Gin Kim DO     Chief Complaint: umbilical hernia    HPI:   Pratibha Bernard is a 49 y.o. male who presents for evaluation of umbilical hernia that is enlarging and becoming more painful, tolerating a diet and moving bowels. Past Medical History:   Diagnosis Date    Cancer [facility])     Oral cancer    Class 2 severe obesity due to excess calories with serious comorbidity and body mass index (BMI) of 36.0 to 36.9 in adult [facility])     Hypertension     Kidney stone     Pancreatitis     Psoriasis     Renal calculi        Past Surgical History:   Procedure Laterality Date    COLONOSCOPY N/A 2022    COLONOSCOPY DIAGNOSTIC performed by Papi Sexton MD at [address] [place] [address] E 2022    HEMORRHOIDECTOMY performed by Monda Apgar, MD at [address] [place] [address]      Due to oral cancer.  Also had part or tounge removed    UPPER GASTROINTESTINAL ENDOSCOPY N/A 2022    EGD ESOPHAGOGASTRODUODENOSCOPY performed by Marcus Gonzalez DO at [place] [address] Facility-Administered Medications   Medication Dose Route Frequency Provider Last Rate Last Admin    lactated ringers infusion   IntraVENous Continuous Monda Apgar,  mL/hr at 22 0635 New Bag at 22 0635    sodium chloride flush 0.9 % injection 5-40 mL  5-40 mL IntraVENous 2 times per day Monda Apgar, MD        sodium chloride flush 0.9 % injection 5-40 mL  5-40 mL IntraVENous PRN Monda Apgar, MD        0.9 % sodium chloride infusion   IntraVENous PRN Monda Apgar, MD        ceFAZolin (ANCEF) 2,000 mg in sterile water 20 mL IV syringe  2,000 mg IntraVENous On Call to Genoveva Houston MD           No Known Allergies    The patient has a family history that is negative for severe cardiovascular or respiratory issues, negative for reaction to anesthesia. Social History     Socioeconomic History    Marital status: Single     Spouse name: Not on file    Number of children: 2    Years of education: Not on file    Highest education level: Not on file   Occupational History    Occupation: mechanical    Tobacco Use    Smoking status: Every Day     Packs/day: 1.00     Types: Cigarettes    Smokeless tobacco: Former   Vaping Use    Vaping Use: Never used   Substance and Sexual Activity    Alcohol use:  Yes     Alcohol/week: 35.0 standard drinks     Types: 35 Shots of liquor per week     Comment: 4-6 cocktails daily    Drug use: Never    Sexual activity: Not on file   Other Topics Concern    Not on file   Social History Narrative    Not on file     Social Determinants of Health     Financial Resource Strain: Not on file   Food Insecurity: Not on file   Transportation Needs: Not on file   Physical Activity: Not on file   Stress: Not on file   Social Connections: Not on file   Intimate Partner Violence: Not on file   Housing Stability: Not on file           Review of Systems  Review of Systems -  General ROS: negative for - chills, fatigue or malaise  ENT ROS: negative for - hearing change, nasal congestion or nasal discharge  Allergy and Immunology ROS: negative for - hives, itchy/watery eyes or nasal congestion  Hematological and Lymphatic ROS: negative for - blood clots, blood transfusions, bruising or fatigue  Endocrine ROS: negative for - malaise/lethargy, mood swings, palpitations or polydipsia/polyuria  Breast ROS: negative for - new or changing breast lumps or nipple changes  Respiratory ROS: negative for - sputum changes, stridor, tachypnea or wheezing  Cardiovascular ROS: negative for - irregular heartbeat, loss of consciousness, murmur or orthopnea  Gastrointestinal ROS: negative for - constipation, diarrhea, gas/bloating, heartburn or hematemesis  Genito-Urinary ROS: negative for - genital discharge, genital ulcers or hematuria  Musculoskeletal ROS: negative for - gait disturbance, muscle pain or muscular weakness    Physical exam:   /76   Pulse (!) 108   Temp 98.1 °F (36.7 °C) (Infrared)   Resp 18   Ht 5' 7\" (1.702 m)   Wt 205 lb (93 kg)   SpO2 98%   BMI 32.11 kg/m²   General appearance:  NAD  Pyscho/social: negative for tremors and hallucinations  Head: NCAT, PERRLA, EOMI, red conjunctiva  Neck: supple, no masses  Lungs: CTAB, equal chest rise bilateral  Heart: Reg rate  Abdomen: soft, minimally tender around reducible umbilical hernia 2 cm in size, nondistended  Skin; no lesions  Gu: no cva tenderness  Extremities: extremities normal, atraumatic, no cyanosis or edema    Assessment:  49 y.o. male with umbilical hernia    Plan: To OR for repair  Discussed the risk, benefits and alternatives of surgery including wound infections, bleeding, scar  and the risks of  anesthetic including MI, CVA, sudden death or reactions to anesthetic medications. The patient understands the risks and alternatives. All questions were answered to the patient's satisfaction and they freely signed the consent.     Adryan Mayers MD  6:46 AM  12/23/2022

## 2022-12-23 NOTE — OP NOTE
DATE OF PROCEDURE: 12/23/2022    SURGEON: Kit Mack M.D.   ASSISTANT: none    PREOPERATIVE DIAGNOSIS: Umbilical hernia. POSTOPERATIVE DIAGNOSIS: incarcerated Umbilical hernia. OPERATION: incarcerated Umbilical herniorrhaphy with Mesh. ANESTHESIA: General endotracheal.     ESTIMATED BLOOD LOSS: 24XG    COMPLICATIONS: None. SPECIMEN: sac    BRIEF HISTORY: Obey Mares is a 49 y.o.  male who was found to have a large umbilical hernia when seen in the office. He complained of pain and swelling. I discussed \"umbilical herniorrhaphy, possible mesh placement\" with him, including the procedure, benefits, risks and alternatives and he agreed. PROCEDURE IN DETAIL: The patient was taken to the operative suite and was placed on the table in the supine position. General endotracheal anesthesia was administered. The abdomen was prepped with Betadine and draped in a sterile fashion. A curvilinear incision was made around the right side of the umbilicus and was carried down through the dermis, the subcutaneous tissue, to the incarcerated hernia sac using the electrocautery. Along the way, any bleeding points were cauterized. The umbilical skin was dissected free from the surface of the hernia sac taking care not to injure the skin in any way. The hernia sac was then dissected free from the surrounding subcutaneous tissue all the way down to the fascial defect which was found to measure approximately 2 cm in diameter. Because of the size of the defect and the fact that it was not easily closable without tension, it was decided that it would require a mesh closure. The hernia sac was opened and the incarcerated hernia contents present were reduced back into the abdominal cavity. The excess hernia sac was excised.   The preperitoneal space was opened circumferentially for approximately 2-3 cm in all directions using a combination of sharp dissection with electrocautery and blunt finger dissection. Once this was completed, a ventralex patch(6 by 6 cm) was chosen as the prosthesis of choice. The wound was copiously irrigated . Mikayla Smith After cleaning the anterior surface of the fascia of its fatty attachments circumferentially, interrupted 0-vicryl U-stitches were placed to attach the mesh to the fascia as an underlay and was pulled upward to avoid any kinks or folds in the mesh so that it lay flat below the fascia. All of the sutures were tied and cut at the knots. The wound was copiously irrigated and suctioned dry. Any bleeding points were cauterized. The umbilical skin was then tacked to the fascia to create the normal contour of the umbilicus using a 3-0 Vicryl figure-of-8 suture. The deep dermal layer was closed with 3-0 vicryl inverted interrupted sutures and the skin was closed with a 4-0 Vicryl subcuticular stitch. Sterile dressing was applied. The patient tolerated the procedure well. Sponge, needle and instrument counts were correct. The patient was extubated and sent to the post anesthesia care unit in stable condition.      [unfilled]  7:27 AM  12/23/2022

## 2022-12-26 ENCOUNTER — HOSPITAL ENCOUNTER (OUTPATIENT)
Dept: SLEEP CENTER | Age: 47
Discharge: HOME | End: 2022-12-26
Payer: COMMERCIAL

## 2022-12-26 DIAGNOSIS — G89.18 POST-OP PAIN: Primary | ICD-10-CM

## 2022-12-26 DIAGNOSIS — G47.33 OSA (OBSTRUCTIVE SLEEP APNEA): ICD-10-CM

## 2022-12-26 PROCEDURE — 95811 POLYSOM 6/>YRS CPAP 4/> PARM: CPT

## 2022-12-26 RX ORDER — METHOCARBAMOL 500 MG/1
500 TABLET, FILM COATED ORAL 4 TIMES DAILY
Qty: 40 TABLET | Refills: 0 | Status: SHIPPED | OUTPATIENT
Start: 2022-12-26 | End: 2023-01-05

## 2022-12-26 RX ORDER — TRAMADOL HYDROCHLORIDE 50 MG/1
50 TABLET ORAL EVERY 6 HOURS PRN
Qty: 12 TABLET | Refills: 0 | Status: SHIPPED | OUTPATIENT
Start: 2022-12-26 | End: 2022-12-29

## 2023-01-05 ENCOUNTER — OFFICE VISIT (OUTPATIENT)
Dept: SURGERY | Age: 48
End: 2023-01-05

## 2023-01-05 VITALS
HEIGHT: 67 IN | TEMPERATURE: 98.4 F | HEART RATE: 110 BPM | SYSTOLIC BLOOD PRESSURE: 139 MMHG | DIASTOLIC BLOOD PRESSURE: 85 MMHG | WEIGHT: 205 LBS | BODY MASS INDEX: 32.18 KG/M2

## 2023-01-05 DIAGNOSIS — Z09 POSTOPERATIVE EXAMINATION: Primary | ICD-10-CM

## 2023-01-05 PROCEDURE — 99024 POSTOP FOLLOW-UP VISIT: CPT | Performed by: SURGERY

## 2023-01-05 NOTE — PROGRESS NOTES
Surgery Progress Note            Chief complaint:   Patient Active Problem List   Diagnosis    GI bleed    Grade III hemorrhoids    Umbilical hernia       S: doing well    O:   Vitals:    01/05/23 1003   BP: 139/85   Pulse: (!) 110   Temp: 98.4 °F (36.9 °C)     No intake or output data in the 24 hours ending 01/05/23 1009        Labs:  No results for input(s): WBC, HGB, HCT in the last 72 hours. Invalid input(s): PLR  Lab Results   Component Value Date    CREATININE 1.0 12/02/2022    BUN 7 12/02/2022     12/02/2022    K 4.0 12/02/2022     12/02/2022    CO2 25 12/02/2022     No results for input(s): LIPASE, AMYLASE in the last 72 hours. Physical exam:   /85   Pulse (!) 110   Temp 98.4 °F (36.9 °C) (Temporal)   Ht 5' 7\" (1.702 m)   Wt 205 lb (93 kg)   BMI 32.11 kg/m²   General appearance: NAD  Head: NCAT  Neck: supple, no masses  Lungs: equal chest rise bilateral  Heart: S1S2 present  Abdomen: soft, nontender, nondistended  Skin; no new lesions, incisions clean and intact  Gu: no cva tenderness  Extremities: extremities normal, atraumatic, no cyanosis or edema    A:  Post op umbilical hernia repair with mesh    P: follow up as needed.      Mayra Cho MD, MD  1/5/2023

## 2023-01-08 DIAGNOSIS — G47.33 OSA (OBSTRUCTIVE SLEEP APNEA): Primary | ICD-10-CM

## 2023-01-08 NOTE — PROCEDURES
[address]       [address] [facility],  [address]                  [facility] [place]              PSG WITH CPAP/BPAP TITRATION SLEEP STUDY REPORT       PATIENT NAME: Evangelist Wallace               : 1975        MED REC NO:  [id number]     ACCOUNT NO: [de-identified]   PROVIDER:  Ace Pinzon DO  DATE OF STUDY: 2022     SERVICE PROVIDER:  Gagandeep Winston     REFERRING PROVIDER:   Dr Rajani Suggs    AGE: 49 y.o.  yrs       SEX: male           HEIGHT: 79 in         WEIGHT: 210 lbs          BMI: 32.9 kg/m2    NECK CIRCUMFERENCE: 17 in    Symptoms: Loud snoring, witnessed apneas, wakes coughing, nocturia, morning headaches, excessive daytime sleepiness. The [place] Sleepiness Scale was 20 out of 24 (scores above or equal to 10 are suggestive of hypersomnolence). Indication: Evaluate optimal CPAP pressure for control of JESSICA. The patient had a previous home sleep test 2022 with AHI 97.9, SPO2 ori 51%. Past Medical History:   Diagnosis Date    Cancer [facility])     Oral cancer    Class 2 severe obesity due to excess calories with serious comorbidity and body mass index (BMI) of 36.0 to 36.9 in adult [facility])     Hypertension     Kidney stone     Pancreatitis     Psoriasis     Renal calculi        Current Outpatient Medications:     ibuprofen (ADVIL;MOTRIN) 800 MG tablet, Take 1 tablet by mouth every 6 hours as needed for Pain, Disp: 20 tablet, Rfl: 0    ibuprofen (ADVIL;MOTRIN) 800 MG tablet, Take 1 tablet by mouth every 6 hours as needed for Pain, Disp: 20 tablet, Rfl: 0    ondansetron (ZOFRAN) 4 MG tablet, Take 1 tablet by mouth 3 times daily as needed for Nausea or Vomiting, Disp: 15 tablet, Rfl: 0    losartan (COZAAR) 25 MG tablet, Take 25 mg by mouth daily, Disp: , Rfl:     clobetasol (TEMOVATE) 0.05 % cream, Apply 1 each topically as needed Apply topically 2 times daily. , Disp: , Rfl:     amLODIPine (NORVASC) 5 MG tablet, Take 5 mg by mouth daily, Disp: , Rfl:       DESCRIPTION: This patient has been previously diagnosed with sleep-disordered breathing and now returns for positive airway pressure titration. This full night of positive airway pressure titration consisted of EEG, EOG, EMG and 2-lead ECG monitoring. Airflow (internal CPAP/bi-level PAP flow signal), chest and abdominal efforts by respiratory inductance plethysmography or polyvinylidene fluoride (PVDF) sensor, and pulse oximetry were monitored as well. Hypopneas were scored as at least a 30% reduction in amplitude of the semi-quantitative flow signal, associated with a 3% or greater oxygen desaturation. Respiratory effort related arousals (RERAs) were scored as at least a 30% reduction in amplitude of the semi-quantitative flow signal, associated with an EEG microarousal.      FINDINGS:  SLEEP CONTINUITY AND SLEEP ARCHITECTURE:  Testing began at 9:23:14 PM and ended at 4:50:46 AM, for a total recording time (TRT) of 447.5 minutes. The sleep period lasted 424.7 minutes and the total sleep time (TST) was 389.0 minutes, which resulted in a sleep efficiency (TST÷TRT) of 86.9%. The sleep latency (SL) was 22.9 minutes, and the latency to the first occurrence of Stage R was 333.0 minutes. There were 1 Stage R periods observed on this study night, 23 awakenings (i.e. transitions to Stage W from any sleep stage), and 87 total stage transitions. Wake after sleep onset (WASO) time accounted for 35.7 minutes, while the time spent is each sleep stage was 21.0 minutes (Stage N1); 255.0 minutes (Stage N2); 39.0 minutes (Stage N3); and 74.0 minutes (Stage R). The percentage of Total Sleep Time in each stage was: 5.4% (Stage N1); 65.6% (Stage N2); 10.0% (Stage N3); and 19.0% (Stage R). The microarousal index was increased at 11.7. RESPIRATORY MONITORING: CPAP was initiated at 5 cms of water pressure and titrated to 15 cms of water pressure in order to abolish respiratory events.   Patient woke up during the study several times due to discomfort with CPAP, therefore BPAP was trialed. Titration with Bi-level PAP up to 18/14 cms of water pressure was performed. He started to have central apneas at 12 cm H2O, which increased in frequency on the BPAP. Respiratory events were significantly reduced with the use of positive airway pressure therapy. Titration at the final  pressure of 18/14 cms of water pressure resulted in an AHI of 2.4 and a minimum oxyhemoglobin saturation of 86.0% and an average oxyhemoglobin saturation of 90.9%. Residual events were all hypopneas. Time spent on this level of BPAP included supine REM sleep. EEG: No abnormal epileptiform activity was observed. ECG: The electrocardiogram documented normal sinus rhythm. The average heart rate during sleep was 92 beats per minute. PERIODIC LIMB MOVEMENTS:  No periodic limb movements were noted. EMG/VIDEO MONITORING:  Loss of REM atonia, bruxism, and parasomnias were not observed. IMPRESSION:    1. This study demonstrated adequate control of JESSICA with CPAP. 2.  Subjectively, the patient reported sleeping better than usual and being willing to use PAP therapy at home on a chronic basis. 3. The patient had baseline hypoxemia unrelated to JESSICA, likely due to underlying cardiopulmonary disease. The hypoxemia did improve with BPAP. RECOMMENDATIONS:  1. Trial of auto BPAP, minimum EPAP 10 cm H2O, maximum IPAP 18 cm H2O, pressure support 4, will be ordered by sleep medicine provider. Patient is to follow up with [address] [place] [address] Sleep Medicine for JESSICA management. 2.  Per insurance requirements, the patient should be seen in clinical follow up within 3 months of receiving CPAP therapy in order to document adherence to therapy, assess efficacy of the prescribed settings (as based upon a residual AHI of less than or equal to 5 on the device's remote download), and evaluate resolution of sleep-related complaints.   3.  The patient should be strongly counseled against driving while drowsy. 4.  Weight loss efforts should be encouraged, as the severity of obstructive sleep apnea may improve although no guarantee of cure can be made. 5.  The patient may be referred to the [facility] for ongoing management of obstructive sleep apnea and PAP therapy, if the referring provider so desires.      EQUIPMENT ORDERING INFORMATION:  DEVICE: Auto BPAP with heated humidification and a remote modem    SETTINGS:  minimum EPAP 10 cm H2O, maximum IPAP 18 cm H2O, pressure support 4    MASK TYPE: Respironics DreamWear nasal pillows    MASK SIZE: Medium    SUPPLEMENTAL OXYGEN: None

## 2023-01-09 ENCOUNTER — TELEPHONE (OUTPATIENT)
Dept: SLEEP CENTER | Age: 48
End: 2023-01-09

## 2023-01-09 NOTE — TELEPHONE ENCOUNTER
Call to pt to discuss titration results and tx recommendation for Bipap therapy.  LM with call back #

## 2023-01-10 NOTE — TELEPHONE ENCOUNTER
Call to pt to discuss SS results and tx recommendation for pap therapy. LM with call back # and that orders were going to be sent to [facility] as to not delay tx.

## (undated) DEVICE — PENCIL ES L3M BTTN SWCH HOLSTER W/ BLDE ELECTRD EDGE

## (undated) DEVICE — TOWEL,OR,DSP,ST,BLUE,STD,6/PK,12PK/CS: Brand: MEDLINE

## (undated) DEVICE — SYRINGE MED 10ML TRNSLUC BRL PLUNG BLK MRK POLYPR CTRL

## (undated) DEVICE — SYRINGE MED 10ML LUERLOCK TIP W/O SFTY DISP

## (undated) DEVICE — GARMENT,MEDLINE,DVT,INT,CALF,MED, GEN2: Brand: MEDLINE

## (undated) DEVICE — SKIN AFFIX SURG ADHESIVE 72/CS 0.55ML: Brand: MEDLINE

## (undated) DEVICE — FORCEPS BX L240CM JAW DIA2.8MM L CAP W/ NDL MIC MESH TOOTH

## (undated) DEVICE — COVER,LIGHT HANDLE,FLX,1/PK: Brand: MEDLINE INDUSTRIES, INC.

## (undated) DEVICE — GLOVE ORANGE PI 8   MSG9080

## (undated) DEVICE — MARKER,SKIN,WI/RULER AND LABELS: Brand: MEDLINE

## (undated) DEVICE — KENDALL 450 SERIES MONITORING FOAM ELECTRODE - RECTANGULAR SHAPE ( 3/PK): Brand: KENDALL

## (undated) DEVICE — ELECTRODE PT RET AD L9FT HI MOIST COND ADH HYDRGEL CORDED

## (undated) DEVICE — CLOTH SURG PREP PREOPERATIVE CHLORHEXIDINE GLUC 2% READYPREP

## (undated) DEVICE — NEEDLE HYPO 25GA L1.5IN BLU POLYPR HUB S STL REG BVL STR

## (undated) DEVICE — TUBING, SUCTION, 1/4" X 10', STRAIGHT: Brand: MEDLINE

## (undated) DEVICE — MASK,FACE,MAXFLUIDPROTECT,SHIELD/ERLPS: Brand: MEDLINE

## (undated) DEVICE — GAUZE,SPONGE,4"X4",16PLY,XRAY,STRL,LF: Brand: MEDLINE

## (undated) DEVICE — DOUBLE BASIN SET: Brand: MEDLINE INDUSTRIES, INC.

## (undated) DEVICE — GOWN,SIRUS,NONRNF,SETINSLV,XL,20/CS: Brand: MEDLINE

## (undated) DEVICE — NDL CNTR 40CT FM MAG: Brand: MEDLINE INDUSTRIES, INC.

## (undated) DEVICE — SOLUTION IV IRRIG 0.9% SODIUM CHL PLASTIC 2F7125

## (undated) DEVICE — NEPTUNE E-SEP SMOKE EVACUATION PENCIL, COATED, 70MM BLADE, PUSH BUTTON SWITCH: Brand: NEPTUNE E-SEP

## (undated) DEVICE — GAUZE,SPONGE,4"X4",16PLY,STRL,LF,10/TRAY: Brand: MEDLINE

## (undated) DEVICE — GLOVE ORANGE PI 7 1/2   MSG9075

## (undated) DEVICE — PACK SURG LAP CHOLE CUSTOM

## (undated) DEVICE — PAD,ABDOMINAL,5"X9",ST,LF,25/BX: Brand: MEDLINE INDUSTRIES, INC.

## (undated) DEVICE — KIT BEDSIDE REVITAL OX 500ML

## (undated) DEVICE — BLOCK BITE 60FR CAREGUARD

## (undated) DEVICE — YANKAUER,BULB TIP,W/O VENT,RIGID,STERILE: Brand: MEDLINE

## (undated) DEVICE — GOWN ISOLATN REG YEL M WT MULTIPLY SIDETIE LEV 2

## (undated) DEVICE — LUBRICANT SURG JELLY ST BACTER TUBE 4.25OZ

## (undated) DEVICE — INTENDED FOR TISSUE SEPARATION, AND OTHER PROCEDURES THAT REQUIRE A SHARP SURGICAL BLADE TO PUNCTURE OR CUT.: Brand: BARD-PARKER ® STAINLESS STEEL BLADES

## (undated) DEVICE — SPONGE GZ 4IN 4IN 4 PLY N WVN AVANT

## (undated) DEVICE — PACK,LAPAROTOMY,NO GOWNS: Brand: MEDLINE

## (undated) DEVICE — APPLICATOR MEDICATED 26 CC SOLUTION HI LT ORNG CHLORAPREP

## (undated) DEVICE — BLADE ES ELASTOMERIC COAT INSUL DURABLE BEND UPTO 90DEG

## (undated) DEVICE — CONTAINER SPEC COLL 960ML POLYPR TRIANG GRAD INTAKE/OUTPUT

## (undated) DEVICE — 6 X 9  1.75MIL 4-WALL LABGUARD: Brand: MINIGRIP COMMERCIAL LLC

## (undated) DEVICE — READY WET SKIN SCRUB TRAY-LF: Brand: MEDLINE INDUSTRIES, INC.

## (undated) DEVICE — Device: Brand: DEFENDO VALVE AND CONNECTOR KIT